# Patient Record
Sex: MALE | Race: WHITE | Employment: OTHER | URBAN - METROPOLITAN AREA
[De-identification: names, ages, dates, MRNs, and addresses within clinical notes are randomized per-mention and may not be internally consistent; named-entity substitution may affect disease eponyms.]

---

## 2022-02-09 NOTE — PROGRESS NOTES
[address] ELECTROPHYSIOLOGY DEPARTMENT/DIVISION OF CARDIOLOGY  Outpatient Consultation Report  PATIENT: Abril Thorpe  MEDICAL RECORD NUMBER: [id number]  DATE OF SERVICE:  2/9/2022  ATTENDING ELECTROPHYSIOLOGIST:  Gume Reynaga D.O  REFERRING PHYSICIAN: No ref. provider found and No primary care provider on file. CHIEF COMPLAINT: cardiomyopathy    HPI: Abril Thorpe is a 78 y.o. male with a history of CAD sp CABG x 5 (5/2009: LIMA-LAD, SVG-distal RCA, SVG-D1, SVG-RI, SVG-OM1), CM-uncertain etiology, CVA-ischemic (2016), and HTN. He is managed by [facility] with lipitor 80 mg daily, HCTZ 25 mg daily, losartan 25 mg daily, metoprolol XL 50 mg daily, and spironolactone 12.5 mg daily. In 2009, he was diagnosed with multivessel CAD and had CABG. In 2016, he was noted to have LVEF of 30%. It is unclear what his LVEF was prior to this. Echo since that time have consistently reported LVEF of 30-35%. An exercise stress without imaging was performed, which reported frequent PVCs and achieved 92% of APMHR. He is now referred to my office for consideration of primary prevention ICD. Patient denies any complaints at this time. Prior cardiac testing:   · TTE (5/20/21 at [facility]) LVEF = 30-35%, mildly dilated ascending aorta, mild AS, stage I LVDD. · Exercise Stress (10/16/19 at [facility]): no ischemic ECG changes at 92% of APMHR, frequent PVCs noted   · TTE (11/2018 at [facility]): LVEF = 30-35% with inferior akinesis, mild LAE, mild - mod MR, mild - mod AI, mild TR, mild LVDD.      · TTE (1/2017): LVEF = 30-35% with inferior wall akinesis, mild RV dilation, mild RVSD, moderate LAE, mild PATRICK, mild MR, mild TR, mild AI, mild PI.  · TTE (2016): LVEF = 30%, mild LV dilation, mild concentric LVH, moderate RVSD, moderate LAE, moderate MR, mild TR, mod pulm HTN, mild-mod A.  · LHC (5/2009): LM 55% stenosis, 90% mid LAD stenosis, 80% proximal RI stenosis, 60% Diag stenosis, 90% proximal LCx stenosis with diffuse distal disease, and 70% RCA stenosis. No past medical history on file. History reviewed. No pertinent surgical history. No family history on file. There is no family history of sudden cardiac arrest    Social History     Tobacco Use    Smoking status: Not on file    Smokeless tobacco: Not on file   Substance Use Topics    Alcohol use: Not on file       No current outpatient medications on file. No current facility-administered medications for this visit. Not on File    ROS:   Constitutional: Negative for fever, activity change and appetite change. HENT: Negative for epistaxis. Eyes: Negative for diploplia, blurred vision. Respiratory: Negative for cough, chest tightness, shortness of breath and wheezing. Cardiovascular: pertinent positives in HPI  Gastrointestinal: Negative for abdominal pain and blood in stool. Genitourinary: Negative for hematuria and difficulty urinating. Musculoskeletal: Negative for myalgias and gait problem. Skin: Negative for color change and rash. Neurological: Negative for syncope and light-headedness. Psychiatric/Behavioral: Negative for confusion and agitation. The patient is not nervous/anxious. Heme: no bleeding disorders, no melena or hematochezia  All other review of systems are negative     PHYSICAL EXAM:  VS: /68   Pulse 70   Resp 18   Ht 5' 8.5\" (1.74 m)   Wt 166 lb (75.3 kg)   SpO2 98%   BMI 24.87 kg/m²   Constitutional: Well-developed, no acute distress, well groomed  Eyes: conjunctivae normal, no xanthelasma   Ears, Nose, Throat: oral mucosa moist, no cyanosis   Neck: supple, no JVD, no bruits, no thyromegaly   CV: normal rate, regular rhythm,  no murmurs, rubs, or gallops.  PMI is nondisplaced, Peripheral pulses normal including carotid auscultation, no noted aortic bruit, bilateral femoral and pedal pulses are normal in quality  Lungs: clear to auscultation bilaterally, normal respiratory effort without used of accessory muscles, no wheezes  Abdomen: soft, non-tender, bowel sounds present, no masses or hepatomegaly   Extremities: no digital clubbing, no edema   Skin: warm, no rashes   Neuro/Psych: A&O x 3, normal mood and affect    Cardiac testing done today:   · EKG (2/10/22): SR at 70 bpm, 1* AV block, and QTc = 397 msec    Assessment/Plan:  1. CM  -Uncertain etiology, but presumably ischemic.  -In 2016, he was noted to have LVEF of 30%. It is unclear what his LVEF was prior to this. Echo since that time have consistently reported LVEF of 30-35%. An exercise stress without imaging was performed, which reported frequent PVCs. -Last LVEF assessment was almost 1 year ago. Recommend reassessment with TTE.  -Follow-up with my office in 6 weeks to review echocardiogram result. If LVEF reduced recommend 72 hour 3 lead Holter to further evaluate VE as well as exercise nuclear stress. -Medical management per [facility]. 2. CAD sp CABG x 5 (5/2009: LIMA-LAD, SVG-distal RCA, SVG-D1, SVG-RI, SVG-OM1)  -Management per [facility]. 3. CVA-ischemic (5634)  -Uncertain mechanism.  -Consider external rhythm monitor in future followed by ILR if no AF/AFL. I spent a total of 60 minutes reviewing previous notes, test results, and face to face with the patient discussing the diagnosis and importance of compliance with the treatment plan as well as documenting on the day of the visit. Time of the day of service includes:  · Preparing to see the patient (eg. Review of the medical record, such as tests). · Obtaining and/or reviewing separately obtained history. · Ordering prescription medications, tests, and/or procedures. · Communicating results to the patient/family/caregiver. · Counseling/educating the patient/family/caregiver. · Documenting clinical information in the patients electronic record. · Coordination of care for the patient. · Performing a medical appropriate exam and/or evaluation.      Thank you for allowing me to participate in your patient's care. Please call me if there are any questions. Manuel Wakefield D.O.   Cardiac Electrophysiology  [facility]

## 2022-02-10 ENCOUNTER — TELEPHONE (OUTPATIENT)
Dept: CARDIOLOGY | Age: 82
End: 2022-02-10

## 2022-02-10 ENCOUNTER — OFFICE VISIT (OUTPATIENT)
Dept: NON INVASIVE DIAGNOSTICS | Age: 82
End: 2022-02-10
Payer: OTHER GOVERNMENT

## 2022-02-10 VITALS
DIASTOLIC BLOOD PRESSURE: 68 MMHG | HEART RATE: 70 BPM | RESPIRATION RATE: 18 BRPM | SYSTOLIC BLOOD PRESSURE: 122 MMHG | HEIGHT: 69 IN | WEIGHT: 166 LBS | BODY MASS INDEX: 24.59 KG/M2 | OXYGEN SATURATION: 98 %

## 2022-02-10 DIAGNOSIS — I42.9 CARDIOMYOPATHY, UNSPECIFIED TYPE (HCC): Primary | ICD-10-CM

## 2022-02-10 PROCEDURE — 99205 OFFICE O/P NEW HI 60 MIN: CPT | Performed by: STUDENT IN AN ORGANIZED HEALTH CARE EDUCATION/TRAINING PROGRAM

## 2022-02-10 PROCEDURE — 93000 ELECTROCARDIOGRAM COMPLETE: CPT | Performed by: STUDENT IN AN ORGANIZED HEALTH CARE EDUCATION/TRAINING PROGRAM

## 2022-02-10 RX ORDER — LOSARTAN POTASSIUM 25 MG/1
TABLET ORAL
COMMUNITY
Start: 2021-04-27

## 2022-02-10 RX ORDER — SPIRONOLACTONE 25 MG/1
TABLET ORAL
COMMUNITY
Start: 2021-11-02

## 2022-02-10 RX ORDER — ATORVASTATIN CALCIUM 80 MG/1
TABLET, FILM COATED ORAL
COMMUNITY
Start: 2021-10-19

## 2022-02-10 RX ORDER — SILDENAFIL 100 MG/1
TABLET, FILM COATED ORAL
COMMUNITY
Start: 2021-12-01

## 2022-02-10 RX ORDER — HYDROCHLOROTHIAZIDE 25 MG/1
TABLET ORAL
COMMUNITY
Start: 2021-04-12

## 2022-02-10 RX ORDER — METOPROLOL SUCCINATE 50 MG/1
TABLET, EXTENDED RELEASE ORAL
COMMUNITY
Start: 2021-10-14

## 2022-02-10 NOTE — PATIENT INSTRUCTIONS
No medication changes at this time. You will be contacted to schedule echocardiogram. If echocardiogram reported reduced cardiac function, you may need titration of medication as well as potentially additional testing prior to decision regarding need for pacemaker or defibrillator will be made.   Follow-up with Dr Kyle Gasca office in 6 weeks to review result of echocardiogram.

## 2022-02-10 NOTE — TELEPHONE ENCOUNTER
SCHEDULED ECHO FOR 03-28-22. REVIEWED COVID CHECKLIST WITH PATIENT.     Electronically signed by Yosvany Timmons on 2/10/2022 at 1:38 PM

## 2022-04-13 ENCOUNTER — TELEPHONE (OUTPATIENT)
Dept: CARDIOLOGY | Age: 82
End: 2022-04-13

## 2022-04-18 ENCOUNTER — HOSPITAL ENCOUNTER (OUTPATIENT)
Dept: CARDIOLOGY | Age: 82
Discharge: HOME | End: 2022-04-18
Payer: MEDICARE

## 2022-04-18 DIAGNOSIS — I42.9 CARDIOMYOPATHY, UNSPECIFIED TYPE (HCC): ICD-10-CM

## 2022-04-18 LAB
LV EF: 40 %
LVEF MODALITY: NORMAL

## 2022-04-18 PROCEDURE — 93306 TTE W/DOPPLER COMPLETE: CPT

## 2022-04-29 ENCOUNTER — TELEPHONE (OUTPATIENT)
Dept: NON INVASIVE DIAGNOSTICS | Age: 82
End: 2022-04-29

## 2022-04-29 DIAGNOSIS — I49.3 PVC (PREMATURE VENTRICULAR CONTRACTION): Primary | ICD-10-CM

## 2022-05-02 DIAGNOSIS — R94.31 ABNORMAL EKG: Primary | ICD-10-CM

## 2022-05-02 NOTE — TELEPHONE ENCOUNTER
LVEF = 40%. Recommend:  -72 hour 3 lead Holter  -Exercise nuclear stress.  Hold metoprolol for 24 hours prior to test.    Please inform patient.  -Stephie Galvez, DO

## 2022-05-11 ENCOUNTER — TELEPHONE (OUTPATIENT)
Dept: CARDIOLOGY | Age: 82
End: 2022-05-11

## 2022-05-11 NOTE — TELEPHONE ENCOUNTER
Spoke with patient reminder of his appointment on 5/13/22 at 0700 for a Nuclear stress test instructed on NPO after midnight, no caffeine 12 hours prior to the test ,hold Toprol XL 24 hours prior to the test patient verbalized an understanding and confirmed the appointment.

## 2022-05-13 ENCOUNTER — HOSPITAL ENCOUNTER (OUTPATIENT)
Dept: CARDIOLOGY | Age: 82
Discharge: HOME | End: 2022-05-13
Payer: MEDICARE

## 2022-05-13 ENCOUNTER — NURSE ONLY (OUTPATIENT)
Dept: NON INVASIVE DIAGNOSTICS | Age: 82
End: 2022-05-13

## 2022-05-13 VITALS
HEIGHT: 67 IN | DIASTOLIC BLOOD PRESSURE: 64 MMHG | BODY MASS INDEX: 26.06 KG/M2 | WEIGHT: 166 LBS | HEART RATE: 72 BPM | SYSTOLIC BLOOD PRESSURE: 108 MMHG | RESPIRATION RATE: 16 BRPM

## 2022-05-13 DIAGNOSIS — R94.31 ABNORMAL EKG: Primary | ICD-10-CM

## 2022-05-13 DIAGNOSIS — R94.31 ABNORMAL EKG: ICD-10-CM

## 2022-05-13 LAB
LV EF: 44 %
LVEF MODALITY: NORMAL

## 2022-05-13 PROCEDURE — 78452 HT MUSCLE IMAGE SPECT MULT: CPT

## 2022-05-13 PROCEDURE — 2580000003 HC RX 258: Performed by: INTERNAL MEDICINE

## 2022-05-13 PROCEDURE — A9500 TC99M SESTAMIBI: HCPCS | Performed by: INTERNAL MEDICINE

## 2022-05-13 PROCEDURE — 3430000000 HC RX DIAGNOSTIC RADIOPHARMACEUTICAL: Performed by: INTERNAL MEDICINE

## 2022-05-13 PROCEDURE — 93017 CV STRESS TEST TRACING ONLY: CPT

## 2022-05-13 RX ORDER — ASPIRIN 81 MG/1
81 TABLET ORAL
COMMUNITY
Start: 2016-03-15

## 2022-05-13 RX ORDER — SODIUM CHLORIDE 0.9 % (FLUSH) 0.9 %
10 SYRINGE (ML) INJECTION PRN
Status: DISCONTINUED | OUTPATIENT
Start: 2022-05-13 | End: 2022-05-14 | Stop reason: HOSPADM

## 2022-05-13 RX ADMIN — SODIUM CHLORIDE, PRESERVATIVE FREE 10 ML: 5 INJECTION INTRAVENOUS at 07:07

## 2022-05-13 RX ADMIN — Medication 33.1 MILLICURIE: at 08:19

## 2022-05-13 RX ADMIN — Medication 10.5 MILLICURIE: at 07:06

## 2022-05-13 RX ADMIN — SODIUM CHLORIDE, PRESERVATIVE FREE 10 ML: 5 INJECTION INTRAVENOUS at 08:19

## 2022-05-13 NOTE — PROGRESS NOTES
Patient was seen in Office today to have 72 hour 3-lead monitor put on per Dr. Christophe Nissen. Pt tolerated placement and understood use and return of device number [id number].     Electronically signed by Dionne Pate MA on 5/13/2022 at 9:16 AM

## 2022-05-13 NOTE — PROCEDURES
[address],[facility] [address]  [phone]                Exercise Stress Nuclear Gated SPECT Study    Name: Lenora Renae Account Number: [de-identified]    :  1940      Sex: male              Date of Study:  2022    Height: 5' 7\" (170.2 cm)  Weight: 166 lb (75.3 kg)     Ordering Provider: Karma France DO          PCP: No primary care provider on file. Cardiologist:Cirilo Martin DO                      Interpreting Physician: Jacob Parisi DO  _________________________________________________________________________________    Indication:   Evaluation of extent and severity of coronary artery disease    Clinical History:   Patient has prior history of coronary artery disease. Resting ECG:    Sinus rhythm, 75 bpm.  Normal axis. Diffuse T wave inversions. Exercise: The patient exercised using a Jose protocol, completing 5:00 minutes and reaching an estimated work load of 7.0 metabolic equivalents (METS). Resting HR was 75. Peak exercise heart rate was 138 ( 101% of maximum predicted heart rate for age). Baseline /64. Peak exercise /70. The blood pressure response to exercise was normal      Exercise was terminated due to dyspnea. The patient experienced no chest pain with exercise. Exercise ECG:   The patient demonstrated occasional PAC's and occasional PVC's during exercise PVC's,PVC couplets in recovery. With exercise, there were no ST segment changes of significance at the heart rate achieved. Bean treadmill score was 5 implying low risk. IMAGING: Myocardial perfusion imaging was performed at rest 30-35 minutes following the intravenous injection of 10.5 mCi of (Tc-Sestamibi) followed by 10 ml of Normal Saline. At peak exercise, the patient was injected intravenously with 33.1 mCi of (Tc-Sestamibi) followed by 10 ml of Normal Saline. Gated post-stress tomographic imaging was performed 20-25 minutes after stress. FINDINGS: The overall quality of the study was good. Left ventricular cavity size was noted to be normal.    Rotational analog analysis demonstrated soft tissue diaphragmatic attenuation. The gated SPECT stress imaging in the short, vertical long, and horizontal long axis demonstrated     A mild defect was present in the basal inferolateral, basal inferior, mid inferolateral and mid inferior wall(s) that was  moderate sized by quantification. The resting images show no change. Gated SPECT left ventricular ejection fraction was calculated to be 44%, with normal myocardial thickening and wall motion and hypokinesis of the inferior  wall. Impression:    1. Exercise EKG was  negative. 2. The patient experienced no chest pain with exercise. 3. The myocardial perfusion imaging was abnormal.      The abnormality was a a moderate sized fixed defect in the Inferior and inferolateral wall suggestive of a prior MI\    4. Overall left ventricular systolic function was abnormal with regional wall motion abnormalities. 5. Bean treadmill score was 5 implying low risk. 6. Exercise capacity was above average. 7. Low risk general exercise treadmill test.    Thank you for sending your patient to this [employer].      Electronically signed by Alfonso Sales DO on 5/13/22 at 1:09 PM EDT

## 2022-05-16 ENCOUNTER — TELEPHONE (OUTPATIENT)
Dept: NON INVASIVE DIAGNOSTICS | Age: 82
End: 2022-05-16

## 2022-05-16 NOTE — TELEPHONE ENCOUNTER
----- Message from Loree Leahy DO sent at 5/14/2022 10:56 PM EDT -----  Regarding: stress  Please let patient know stress reported LVEF of 44% with evidence of prior infarct, but no ischemia.      Await results of cardiac monitor.    -Loree Leahy DO

## 2022-06-02 DIAGNOSIS — I49.3 PVC (PREMATURE VENTRICULAR CONTRACTION): ICD-10-CM

## 2022-06-27 NOTE — PROGRESS NOTES
[facility] CARDIOLOGY  CARDIAC ELECTROPHYSIOLOGY DEPARTMENT/DIVISION OF CARDIOLOGY  Outpatient Progress Report  PATIENT: Martha Vuong RECORD NUMBER: [id number]  DATE OF SERVICE:  7/1/2022  ATTENDING ELECTROPHYSIOLOGIST:  Jake Bean D.O  REFERRING PHYSICIAN: No ref. provider found and No primary care provider on file. CHIEF COMPLAINT: cardiomyopathy    HPI: Hua Dee is a 78 y.o. male with a history of NYHA class I HFpEF- borderline/ischemic sp CABG x 5 (5/2009: LIMA-LAD, SVG-distal RCA, SVG-D1, SVG-RI, SVG-OM1), CVA-ischemic (2016), and HTN. He is managed by [facility] with aspirin 81 mg daily, lipitor 80 mg daily, HCTZ 25 mg daily, losartan 25 mg daily, metoprolol XL 50 mg daily, and spironolactone 12.5 mg daily. In 2009, he was diagnosed with multivessel CAD, which was treated with CABG 5. In 2016, he was noted to have LVEF of 30%. It is unclear what his LVEF was prior to this. Echo since that time have consistently reported LVEF of 30-35%. An exercise stress without imaging was performed, which reported frequent PVCs and achieved 92% of APMHR. In 2/2022, patient was referred to my office for evaluation. I recommended a TTE, event monitor, and stress test.  Results of testing significant for LVEF of 40-44% with inferior and inferior lateral infarct but no ischemia and no significant dysrhythmias. He now present today, 7/1/22; for follow-up with my office. He denies any complaints at this time. Prior cardiac testing:   · 72 hour 3 lead Holter (5/16/22): SR at 49 - 128 bpm (mean: 72 bpm), SVE burden < 1%, 2 episodes of nonsustained SVT (longest/fastest: 3 beats at 111 bpm), VE burden = 3.42% (3 morphologies: 70% 1 morphology, 30% 2nd morphology, <1% 3rd morphology), 1 episode of asymptoamtic non-sustained VT (3 beats at ~100 bpm),1 patient event during SR with single PVC, and no AF, VT, significant AV block, or significat pauses.   · Exercise Stress Nuclear: 5/13/2022: Achieved 101% of APM HR (138 bpm), 7 METS, above average exercise capacity, LVEF = 44% with hypokinetic inferior wall, inferior and inferior lateral wall infarct, and no ischemia. · TTE: 2022: LVEF: 40%, mild concentric LVH (max = 1.3 cm), mild LAE, mild MR, mild AI. · EKG (2/10/22): SR at 70 bpm, 1* AV block, and QTc = 397 msec  · TTE (21 at [facility]) LVEF = 30-35%, mildly dilated ascending aorta, mild AS, stage I LVDD. · Exercise Stress (10/16/19 at [facility]): no ischemic ECG changes at 92% of APMHR, frequent PVCs noted   · TTE (2018 at [facility]): LVEF = 30-35% with inferior akinesis, mild LAE, mild - mod MR, mild - mod AI, mild TR, mild LVDD. · TTE (2017): LVEF = 30-35% with inferior wall akinesis, mild RV dilation, mild RVSD, moderate LAE, mild PATRICK, mild MR, mild TR, mild AI, mild PI.  · TTE (): LVEF = 30%, mild LV dilation, mild concentric LVH, moderate RVSD, moderate LAE, moderate MR, mild TR, mod pulm HTN, mild-mod A.  · [facility] (2009): LM 55% stenosis, 90% mid LAD stenosis, 80% proximal RI stenosis, 60% Diag stenosis, 90% proximal LCx stenosis with diffuse distal disease, and 70% RCA stenosis. History reviewed. No pertinent past medical history. History reviewed. No pertinent surgical history. Family History   Problem Relation Age of Onset    Heart Surgery Brother      There is no family history of sudden cardiac arrest    Social History     Tobacco Use    Smoking status: Former Smoker     Types: Cigars     Quit date:      Years since quittin.5    Smokeless tobacco: Never Used   Substance Use Topics    Alcohol use:  Yes     Alcohol/week: 4.0 standard drinks     Types: 4 Glasses of wine per week     Comment: 8 oz of wine daily       Current Outpatient Medications   Medication Sig Dispense Refill    aspirin 81 MG EC tablet Take 81 mg by mouth      CO ENZYME Q-10 PO Take 1 capsule by mouth daily      losartan (COZAAR) 25 MG tablet TAKE ONE TABLET BY MOUTH EVERY DAY      Miconazole Nitrate 2 % SOLN APPLY A SUFFICIENT AMOUNT EXTERNALLY EVERY DAY TO TOENAILS      spironolactone (ALDACTONE) 25 MG tablet TAKE ONE-HALF TABLET BY MOUTH EVERY DAY **FOR HEART**      metoprolol succinate (TOPROL XL) 50 MG extended release tablet TAKE ONE TABLET BY MOUTH EVERY DAY      hydroCHLOROthiazide (HYDRODIURIL) 25 MG tablet TAKE ONE TABLET BY MOUTH EVERY MORNING      atorvastatin (LIPITOR) 80 MG tablet TAKE ONE TABLET BY MOUTH AT BEDTIME      sildenafil (VIAGRA) 100 MG tablet TAKE ONE TABLET BY MOUTH AN HOUR_BEFORE SEX. (NO MORE THAN 1 DOSE PER 24 HOURS) NO NITRATES       No current facility-administered medications for this visit. Allergies   Allergen Reactions    Tadalafil Other (See Comments)     ? Ringing in the ears      Atenolol Rash       ROS:   Constitutional: Negative for fever, activity change and appetite change. HENT: Negative for epistaxis. Eyes: Negative for diploplia, blurred vision. Respiratory: Negative for cough, chest tightness, shortness of breath and wheezing. Cardiovascular: pertinent positives in HPI  Gastrointestinal: Negative for abdominal pain and blood in stool. Genitourinary: Negative for hematuria and difficulty urinating. Musculoskeletal: Negative for myalgias and gait problem. Skin: Negative for color change and rash. Neurological: Negative for syncope and light-headedness. Psychiatric/Behavioral: Negative for confusion and agitation. The patient is not nervous/anxious.   Heme: no bleeding disorders, no melena or hematochezia  All other review of systems are negative     PHYSICAL EXAM:  VS: /70 (Site: Left Upper Arm, Position: Sitting, Cuff Size: Medium Adult)   Pulse 60   Resp 18   Ht 5' 7\" (1.702 m)   Wt 161 lb 3.2 oz (73.1 kg)   BMI 25.25 kg/m²   Constitutional: Well-developed, no acute distress, well groomed  Eyes: conjunctivae normal, no xanthelasma   Ears, Nose, Throat: oral mucosa moist, no cyanosis   Neck: supple, no JVD, no thyromegaly   CV: normal rate, regular rhythm,  no murmurs, rubs, or gallops. PMI is nondisplaced, Peripheral pulses normal including bilateral radial and pedal pulses are normal in quality  Lungs: clear to auscultation bilaterally, normal respiratory effort without used of accessory muscles, no wheezes  Abdomen: soft, non-tender, bowel sounds present, no masses or hepatomegaly   Extremities: no digital clubbing, no edema   Skin: warm, no rashes   Neuro/Psych: A&O x 3, normal mood and affect    Cardiac testing done today:   · EKG (7/1/22): SR at 60 bpm, PACs, QTC = 420 ms    Assessment/Plan:  1. NYHA class I HFpEF- borderline/Ischemic sp CABG x 5 (5/2009: LIMA-LAD, SVG-distal RCA, SVG-D1, SVG-RI, SVG-OM1)  -Medical management per [facility]. 2. CVA-ischemic (5621)  -Uncertain mechanism after echo and external monitor.  -Recommend [employer] ILR for ongoing monitoring of occult AF. I reviewed indications, material risks, and benefits. He is agreeable to proceed. -Follow-up with [facility] 2 weeks after implant.  -Follow-up with Dr Mckeon office in 1 year. I spent a total of 40 minutes reviewing previous notes, test results, and face to face with the patient discussing the diagnosis and importance of compliance with the treatment plan as well as documenting on the day of the visit. Time of the day of service includes:  · Preparing to see the patient (eg. Review of the medical record, such as tests). · Obtaining and/or reviewing separately obtained history. · Ordering prescription medications, tests, and/or procedures. · Communicating results to the patient/family/caregiver. · Counseling/educating the patient/family/caregiver. · Documenting clinical information in the patients electronic record. · Coordination of care for the patient. · Performing a medical appropriate exam and/or evaluation. Thank you for allowing me to participate in your patient's care.   Please call me if there are any questions. Sridevi Henriquez D.O. Cardiac Electrophysiology  [facility]  [facility]    Cc: No primary care provider on file.

## 2022-07-01 ENCOUNTER — OFFICE VISIT (OUTPATIENT)
Dept: NON INVASIVE DIAGNOSTICS | Age: 82
End: 2022-07-01
Payer: MEDICARE

## 2022-07-01 VITALS
WEIGHT: 161.2 LBS | DIASTOLIC BLOOD PRESSURE: 70 MMHG | HEIGHT: 67 IN | RESPIRATION RATE: 18 BRPM | BODY MASS INDEX: 25.3 KG/M2 | HEART RATE: 60 BPM | SYSTOLIC BLOOD PRESSURE: 138 MMHG

## 2022-07-01 DIAGNOSIS — I42.9 CARDIOMYOPATHY, UNSPECIFIED TYPE (HCC): ICD-10-CM

## 2022-07-01 DIAGNOSIS — I49.3 PVC (PREMATURE VENTRICULAR CONTRACTION): Primary | ICD-10-CM

## 2022-07-01 PROBLEM — Z95.1 POSTSURGICAL AORTOCORONARY BYPASS STATUS: Status: ACTIVE | Noted: 2022-07-01

## 2022-07-01 PROBLEM — I25.10 ISCHEMIC DILATED CARDIOMYOPATHY DUE TO CORONARY ARTERY DISEASE: Status: ACTIVE | Noted: 2022-07-01

## 2022-07-01 PROBLEM — J90 PLEURAL EFFUSION: Status: ACTIVE | Noted: 2022-07-01

## 2022-07-01 PROBLEM — S44.90XA: Status: ACTIVE | Noted: 2022-07-01

## 2022-07-01 PROBLEM — H93.19 SUBJECTIVE TINNITUS: Status: ACTIVE | Noted: 2022-07-01

## 2022-07-01 PROBLEM — M72.2 PLANTAR FASCIITIS: Status: ACTIVE | Noted: 2022-07-01

## 2022-07-01 PROBLEM — N40.0 HYPERTROPHY OF PROSTATE WITHOUT URINARY OBSTRUCTION AND OTHER LOWER URINARY TRACT SYMPTOMS (LUTS): Status: ACTIVE | Noted: 2022-07-01

## 2022-07-01 PROBLEM — M25.559 PAIN IN JOINT INVOLVING PELVIC REGION AND THIGH: Status: ACTIVE | Noted: 2022-07-01

## 2022-07-01 PROBLEM — H91.90 HEARING LOSS: Status: ACTIVE | Noted: 2022-07-01

## 2022-07-01 PROBLEM — I25.5 ISCHEMIC DILATED CARDIOMYOPATHY DUE TO CORONARY ARTERY DISEASE: Status: ACTIVE | Noted: 2022-07-01

## 2022-07-01 PROBLEM — F52.8 PSYCHOSEXUAL DYSFUNCTION WITH INHIBITED SEXUAL EXCITEMENT: Status: ACTIVE | Noted: 2022-07-01

## 2022-07-01 PROBLEM — N42.83 PROSTATIC CYST: Status: ACTIVE | Noted: 2022-07-01

## 2022-07-01 PROBLEM — M25.519 SHOULDER PAIN: Status: ACTIVE | Noted: 2022-07-01

## 2022-07-01 PROBLEM — I25.10 ATHEROSCLEROTIC HEART DISEASE OF NATIVE CORONARY ARTERY WITHOUT ANGINA PECTORIS: Status: ACTIVE | Noted: 2022-07-01

## 2022-07-01 PROBLEM — B35.1 TINEA UNGUIUM: Status: ACTIVE | Noted: 2022-07-01

## 2022-07-01 PROBLEM — R41.3 MILD MEMORY DISTURBANCE: Status: ACTIVE | Noted: 2022-07-01

## 2022-07-01 PROBLEM — R69 OTHER ILL-DEFINED AND UNKNOWN CAUSES OF MORBIDITY AND MORTALITY: Status: ACTIVE | Noted: 2022-07-01

## 2022-07-01 PROBLEM — E78.00 HYPERCHOLESTEROLEMIA: Status: ACTIVE | Noted: 2022-07-01

## 2022-07-01 PROBLEM — M51.26 HERNIATION OF LUMBAR INTERVERTEBRAL DISC WITHOUT MYELOPATHY: Status: ACTIVE | Noted: 2022-07-01

## 2022-07-01 PROBLEM — I20.0 UNSTABLE ANGINA PECTORIS (HCC): Status: ACTIVE | Noted: 2022-07-01

## 2022-07-01 PROBLEM — E78.5 DYSLIPIDEMIA: Status: ACTIVE | Noted: 2022-07-01

## 2022-07-01 PROBLEM — H72.00 CENTRAL PERFORATION OF TYMPANIC MEMBRANE: Status: ACTIVE | Noted: 2022-07-01

## 2022-07-01 PROBLEM — I50.22 CHRONIC SYSTOLIC (CONGESTIVE) HEART FAILURE (HCC): Status: ACTIVE | Noted: 2022-07-01

## 2022-07-01 PROBLEM — H53.453 OTHER LOCALIZED VISUAL FIELD DEFECT, BILATERAL: Status: ACTIVE | Noted: 2022-07-01

## 2022-07-01 PROBLEM — M77.9 ENTHESOPATHY: Status: ACTIVE | Noted: 2022-07-01

## 2022-07-01 PROBLEM — H53.461 RIGHT HOMONYMOUS HEMIANOPSIA: Status: ACTIVE | Noted: 2022-07-01

## 2022-07-01 PROBLEM — M54.9 BACK PAIN: Status: ACTIVE | Noted: 2022-07-01

## 2022-07-01 PROBLEM — I25.5 ISCHEMIC CARDIOMYOPATHY: Status: ACTIVE | Noted: 2022-07-01

## 2022-07-01 PROBLEM — M77.10 LATERAL EPICONDYLITIS: Status: ACTIVE | Noted: 2022-07-01

## 2022-07-01 PROBLEM — H90.3 SENSORINEURAL HEARING LOSS, BILATERAL: Status: ACTIVE | Noted: 2022-07-01

## 2022-07-01 PROBLEM — I10 BENIGN ESSENTIAL HYPERTENSION: Status: ACTIVE | Noted: 2022-07-01

## 2022-07-01 PROBLEM — I73.9 PERIPHERAL VASCULAR DISEASE, UNSPECIFIED (HCC): Status: ACTIVE | Noted: 2022-07-01

## 2022-07-01 PROCEDURE — 93000 ELECTROCARDIOGRAM COMPLETE: CPT | Performed by: STUDENT IN AN ORGANIZED HEALTH CARE EDUCATION/TRAINING PROGRAM

## 2022-07-01 PROCEDURE — 99215 OFFICE O/P EST HI 40 MIN: CPT | Performed by: STUDENT IN AN ORGANIZED HEALTH CARE EDUCATION/TRAINING PROGRAM

## 2022-07-01 PROCEDURE — 1123F ACP DISCUSS/DSCN MKR DOCD: CPT | Performed by: STUDENT IN AN ORGANIZED HEALTH CARE EDUCATION/TRAINING PROGRAM

## 2022-07-01 NOTE — PATIENT INSTRUCTIONS
No medication changes at this time. You will be contacted to schedule [facility] [employer] cardiac rhythm monitor implant due to history of cryptogenic stroke. Follow-up with Device [facility] 2 weeks after implant. Follow-up with Dr Sanam Hanna office in 1 year.

## 2022-07-13 ENCOUNTER — TELEPHONE (OUTPATIENT)
Dept: CARDIAC CATH/INVASIVE PROCEDURES | Age: 82
End: 2022-07-13

## 2022-07-13 NOTE — TELEPHONE ENCOUNTER
Reminded patient of scheduled procedure on  7/14   Instructions given and COVID questionnaire completed.

## 2022-07-14 ENCOUNTER — HOSPITAL ENCOUNTER (OUTPATIENT)
Dept: CARDIAC CATH/INVASIVE PROCEDURES | Age: 82
Discharge: HOME | End: 2022-07-14
Payer: MEDICARE

## 2022-07-14 VITALS
TEMPERATURE: 97.6 F | RESPIRATION RATE: 20 BRPM | HEART RATE: 67 BPM | WEIGHT: 160 LBS | BODY MASS INDEX: 25.11 KG/M2 | HEIGHT: 67 IN | SYSTOLIC BLOOD PRESSURE: 151 MMHG | OXYGEN SATURATION: 97 % | DIASTOLIC BLOOD PRESSURE: 77 MMHG

## 2022-07-14 PROCEDURE — 33285 INSJ SUBQ CAR RHYTHM MNTR: CPT | Performed by: STUDENT IN AN ORGANIZED HEALTH CARE EDUCATION/TRAINING PROGRAM

## 2022-07-14 PROCEDURE — C1764 EVENT RECORDER, CARDIAC: HCPCS

## 2022-07-14 PROCEDURE — 33285 INSJ SUBQ CAR RHYTHM MNTR: CPT

## 2022-07-14 NOTE — OP NOTE
Operative Note      Patient: Tito Kolb  YOB: 1940  MRN: [id number]    Date of Procedure:  7/14/2022      Patient History: Tito Kolb is a 78 y.o. male with a history of NYHA class I HFpEF- borderline/ischemic sp CABG x 5 (5/2009: LIMA-LAD, SVG-distal RCA, SVG-D1, SVG-RI, SVG-OM1), CVA-ischemic (2016), and HTN. He is managed by [facility] with aspirin 81 mg daily, lipitor 80 mg daily, HCTZ 25 mg daily, losartan 25 mg daily, metoprolol XL 50 mg daily, and spironolactone 12.5 mg daily. In 2009, he was diagnosed with multivessel CAD, which was treated with CABG 5. In 2016, he was noted to have LVEF of 30%. It is unclear what his LVEF was prior to this. Echo since that time have consistently reported LVEF of 30-35%. An exercise stress without imaging was performed, which reported frequent PVCs and achieved 92% of APMHR. In 2/2022, patient was referred to my office for evaluation. I recommended a TTE, event monitor, and stress test.  Results of testing significant for LVEF of 40-44% with inferior and inferior lateral infarct but no ischemia and no significant dysrhythmias. He presents today, 7/14/2022; for outpatient [facility] cardiac monitor implant for further monitoring for occult AF in the setting of cryptogenic stroke-ischemic of uncertain etiology after evaluation, including TTE and external rhythm monitor. He denies any complaints at this time. Pre-Op Diagnosis: cryptogenic stroke     Post-Op Diagnosis: Same     Surgery: [employer] LUX cardiac monitor implant (CPT code: 67538)     Surgeon: Dilip Perez DO     Assistant: None     Anesthesia: local     Estimated Blood Loss (mL): 5 mL     Complications: none     Detailed Description of Procedure:   Verbal and written consent was obtained from the patient and/or family. Patient was brought to the procedure room in a fasting state. The chest was cleansed with chlorhexidine gluconate and draped in a sterile fashion.

## 2022-07-14 NOTE — H&P
[address] ELECTROPHYSIOLOGY DEPARTMENT/DIVISION OF CARDIOLOGY  H&P  PATIENT: Martha Vuong RECORD NUMBER: [id number]  DATE OF SERVICE:  7/14/2022  ATTENDING ELECTROPHYSIOLOGIST:  Miky Tao D.O  REFERRING PHYSICIAN: Jessica Davis DO and No primary care provider on file. CHIEF COMPLAINT: cardiomyopathy    HPI: Jackson Lawrence is a 78 y.o. male with a history of NYHA class I HFpEF- borderline/ischemic sp CABG x 5 (5/2009: LIMA-LAD, SVG-distal RCA, SVG-D1, SVG-RI, SVG-OM1), CVA-ischemic (2016), and HTN. He is managed by [facility] with aspirin 81 mg daily, lipitor 80 mg daily, HCTZ 25 mg daily, losartan 25 mg daily, metoprolol XL 50 mg daily, and spironolactone 12.5 mg daily. In 2009, he was diagnosed with multivessel CAD, which was treated with CABG 5. In 2016, he was noted to have LVEF of 30%. It is unclear what his LVEF was prior to this. Echo since that time have consistently reported LVEF of 30-35%. An exercise stress without imaging was performed, which reported frequent PVCs and achieved 92% of APMHR. In 2/2022, patient was referred to my office for evaluation. I recommended a TTE, event monitor, and stress test.  Results of testing significant for LVEF of 40-44% with inferior and inferior lateral infarct but no ischemia and no significant dysrhythmias. He presents today, 7/14/2022; for outpatient [facility] cardiac monitor implant for further monitoring for occult AF in the setting of cryptogenic stroke-ischemic of uncertain etiology after evaluation, including TTE and external rhythm monitor. He denies any complaints at this time.     Prior cardiac testing:   · EKG (7/1/22): SR at 60 bpm, PACs, QTC = 420 ms  · 72 hour 3 lead Holter (5/16/22): SR at 49 - 128 bpm (mean: 72 bpm), SVE burden < 1%, 2 episodes of nonsustained SVT (longest/fastest: 3 beats at 111 bpm), VE burden = 3.42% (3 morphologies: 70% 1 morphology, 30% 2nd morphology, <1% 3rd morphology), 1 episode of asymptoamtic non-sustained VT (3 beats at ~100 bpm),1 patient event during SR with single PVC, and no AF, VT, significant AV block, or significat pauses. · Exercise Stress Nuclear: 2022: Achieved 101% of APM HR (138 bpm), 7 METS, above average exercise capacity, LVEF = 44% with hypokinetic inferior wall, inferior and inferior lateral wall infarct, and no ischemia. · TTE: 2022: LVEF: 40%, mild concentric LVH (max = 1.3 cm), mild LAE, mild MR, mild AI. · EKG (2/10/22): SR at 70 bpm, 1* AV block, and QTc = 397 msec  · TTE (21 at [facility]) LVEF = 30-35%, mildly dilated ascending aorta, mild AS, stage I LVDD. · Exercise Stress (10/16/19 at [facility]): no ischemic ECG changes at 92% of APMHR, frequent PVCs noted   · TTE (2018 at [facility]): LVEF = 30-35% with inferior akinesis, mild LAE, mild - mod MR, mild - mod AI, mild TR, mild LVDD. · TTE (2017): LVEF = 30-35% with inferior wall akinesis, mild RV dilation, mild RVSD, moderate LAE, mild PATRICK, mild MR, mild TR, mild AI, mild PI.  · TTE (): LVEF = 30%, mild LV dilation, mild concentric LVH, moderate RVSD, moderate LAE, moderate MR, mild TR, mod pulm HTN, mild-mod A.  · LHC (2009): LM 55% stenosis, 90% mid LAD stenosis, 80% proximal RI stenosis, 60% Diag stenosis, 90% proximal LCx stenosis with diffuse distal disease, and 70% RCA stenosis.     Past Medical History:   Diagnosis Date    CAD (coronary artery disease)     Bypass  5 vessel [facility]     Hyperlipidemia     Hypertension      Past Surgical History:   Procedure Laterality Date    SHOULDER SURGERY Right     \"1088'H \"  Football injury       Family History   Problem Relation Age of Onset    Heart Surgery Brother      There is no family history of sudden cardiac arrest    Social History     Tobacco Use    Smoking status: Former Smoker     Types: Cigars     Quit date:      Years since quittin.5    Smokeless tobacco: Never Used   Substance Use Topics    Alcohol use: Yes     Alcohol/week: 4.0 standard drinks     Types: 4 Glasses of wine per week     Comment: 8 oz of wine daily       Current Outpatient Medications   Medication Sig Dispense Refill    aspirin 81 MG EC tablet Take 81 mg by mouth      CO ENZYME Q-10 PO Take 1 capsule by mouth daily      losartan (COZAAR) 25 MG tablet TAKE ONE TABLET BY MOUTH EVERY DAY      Miconazole Nitrate 2 % SOLN APPLY A SUFFICIENT AMOUNT EXTERNALLY EVERY DAY TO TOENAILS      spironolactone (ALDACTONE) 25 MG tablet TAKE ONE-HALF TABLET BY MOUTH EVERY DAY **FOR HEART**      metoprolol succinate (TOPROL XL) 50 MG extended release tablet TAKE ONE TABLET BY MOUTH EVERY DAY      hydroCHLOROthiazide (HYDRODIURIL) 25 MG tablet TAKE ONE TABLET BY MOUTH EVERY MORNING      atorvastatin (LIPITOR) 80 MG tablet TAKE ONE TABLET BY MOUTH AT BEDTIME      sildenafil (VIAGRA) 100 MG tablet TAKE ONE TABLET BY MOUTH AN HOUR_BEFORE SEX. (NO MORE THAN 1 DOSE PER 24 HOURS) NO NITRATES       No current facility-administered medications for this encounter. Allergies   Allergen Reactions    Tadalafil Other (See Comments)     ? Ringing in the ears      Atenolol Rash       ROS:   Constitutional: Negative for fever, activity change and appetite change. HENT: Negative for epistaxis. Eyes: Negative for diploplia, blurred vision. Respiratory: Negative for cough, chest tightness, shortness of breath and wheezing. Cardiovascular: pertinent positives in HPI  Gastrointestinal: Negative for abdominal pain and blood in stool. Genitourinary: Negative for hematuria and difficulty urinating. Musculoskeletal: Negative for myalgias and gait problem. Skin: Negative for color change and rash. Neurological: Negative for syncope and light-headedness. Psychiatric/Behavioral: Negative for confusion and agitation. The patient is not nervous/anxious.   Heme: no bleeding disorders, no melena or hematochezia  All other review of systems are negative     PHYSICAL EXAM:  VS: BP (!) 151/77   Pulse 67   Temp 97.6 °F (36.4 °C) (Temporal)   Resp 20   Ht 5' 7\" (1.702 m)   Wt 160 lb (72.6 kg)   SpO2 97%   BMI 25.06 kg/m²   Constitutional: Well-developed, no acute distress, well groomed  Eyes: conjunctivae normal, no xanthelasma   Ears, Nose, Throat: oral mucosa moist, no cyanosis   Neck: supple, no JVD, no thyromegaly   CV: normal rate, regular rhythm,  no murmurs, rubs, or gallops. PMI is nondisplaced, Peripheral pulses normal including bilateral radial and pedal pulses are normal in quality  Lungs: clear to auscultation bilaterally, normal respiratory effort without used of accessory muscles, no wheezes  Abdomen: soft, non-tender, bowel sounds present, no masses or hepatomegaly   Extremities: no digital clubbing, no edema   Skin: warm, no rashes   Neuro/Psych: A&O x 3, normal mood and affect    Assessment/Plan:  1. Cryptogenic Stroke-ischemic (0481)  -Uncertain mechanism after echo and external monitor.  -Recommend [employer] ILR for ongoing monitoring of occult AF. I reviewed indications, material risks, and benefits. He is agreeable to proceed. -Follow-up with Device [facility] 2 weeks after implant.  -Follow-up with Dr Rhea Allan office in 1 year. 2. NYHA class I HFpEF- borderline/Ischemic sp CABG x 5 (5/2009: LIMA-LAD, SVG-distal RCA, SVG-D1, SVG-RI, SVG-OM1)  -Medical management per [facility]. Thank you for allowing me to participate in your patient's care. Please call me if there are any questions. Johan Shannon D.O.   Cardiac Electrophysiology  [address] [place] [address]

## 2022-07-28 ENCOUNTER — NURSE ONLY (OUTPATIENT)
Dept: NON INVASIVE DIAGNOSTICS | Age: 82
End: 2022-07-28

## 2022-07-28 NOTE — PROGRESS NOTES
Normal In-Office: With Events  1  Events or Alerts: 0  This is a normal in-office diagnostic device check  Battery data was reviewed  Battery status: REBECCA,  Presenting rhythm reviewed  Heart Rate Histograms reviewed  Detection Parameters were evaluated  Additional Notes:  Presenting Vs @ 65 Episodes listed as AF appear to be Vs with early beats. Patient denies any symptms    Created By: Rylee Alejandro 07/28/2022 09:16  Wound Check  1  Device pocket examined  1 week post implant wound check and Device education completed  Wound check was within normal limits (see chart note)  Parameters were reviewed  No changes made  Additional Notes:  Incision well approximated, free of redness, swelling, and drainage. Post implant wound care and remote follow-up discussed. Patient voiced understanding of above. Plan: every 31 day remote transmissions, follow up with Dr. Ragini Treviño in one year.     Created By: Rylee Alejandro 07/28/2022 09:17

## 2022-07-28 NOTE — PATIENT INSTRUCTIONS
You may shower starting now    Call if any signs or symptoms of infection [phone] ext: [phone]  Fevers, chills, redness, swelling or drainage.        [facility] [place]  [address] [place] [address] support (home monitoring) [phone]

## 2022-10-03 ENCOUNTER — HOSPITAL ENCOUNTER (EMERGENCY)
Age: 82
Discharge: HOME | End: 2022-10-03
Attending: STUDENT IN AN ORGANIZED HEALTH CARE EDUCATION/TRAINING PROGRAM
Payer: OTHER GOVERNMENT

## 2022-10-03 VITALS
DIASTOLIC BLOOD PRESSURE: 66 MMHG | SYSTOLIC BLOOD PRESSURE: 122 MMHG | TEMPERATURE: 97.7 F | HEART RATE: 68 BPM | OXYGEN SATURATION: 98 % | RESPIRATION RATE: 16 BRPM

## 2022-10-03 DIAGNOSIS — H60.391 INFECTIVE OTITIS EXTERNA OF RIGHT EAR: Primary | ICD-10-CM

## 2022-10-03 PROCEDURE — 99283 EMERGENCY DEPT VISIT LOW MDM: CPT

## 2022-10-03 RX ORDER — AMOXICILLIN AND CLAVULANATE POTASSIUM 875; 125 MG/1; MG/1
1 TABLET, FILM COATED ORAL 2 TIMES DAILY
Qty: 20 TABLET | Refills: 0 | Status: SHIPPED | OUTPATIENT
Start: 2022-10-03 | End: 2022-10-13

## 2022-10-03 RX ORDER — OFLOXACIN 3 MG/ML
10 SOLUTION AURICULAR (OTIC) 2 TIMES DAILY
Qty: 7 ML | Refills: 0 | Status: SHIPPED | OUTPATIENT
Start: 2022-10-03 | End: 2022-10-10

## 2022-10-03 ASSESSMENT — PAIN - FUNCTIONAL ASSESSMENT: PAIN_FUNCTIONAL_ASSESSMENT: 0-10

## 2022-10-03 ASSESSMENT — ENCOUNTER SYMPTOMS
COUGH: 0
EYE PAIN: 0
VOMITING: 0
SHORTNESS OF BREATH: 0
EYE REDNESS: 0
ABDOMINAL PAIN: 0
NAUSEA: 0

## 2022-10-03 ASSESSMENT — PAIN DESCRIPTION - ORIENTATION: ORIENTATION: RIGHT

## 2022-10-03 ASSESSMENT — PAIN DESCRIPTION - LOCATION: LOCATION: EAR

## 2022-10-03 ASSESSMENT — PAIN DESCRIPTION - DESCRIPTORS: DESCRIPTORS: ACHING

## 2022-10-03 ASSESSMENT — PAIN SCALES - GENERAL: PAINLEVEL_OUTOF10: 10

## 2022-10-03 NOTE — ED PROVIDER NOTES
HPI   Patient presented to emergency department for evaluation of right ear pain since Saturday. He states he was blowing his nose felt a sudden pop. He states he had had surgery on that ear in the past.  Denies any fevers, chills, nausea, vomiting, sore throat. States his hearing is decreased on that side however he wears hearing aids at baseline. He has not tried anything for symptoms, denies any drainage. Review of Systems   Constitutional:  Negative for chills and fever. HENT:  Positive for ear pain. Negative for congestion and ear discharge. Eyes:  Negative for pain and redness. Respiratory:  Negative for cough and shortness of breath. Cardiovascular:  Negative for chest pain and leg swelling. Gastrointestinal:  Negative for abdominal pain, nausea and vomiting. Musculoskeletal:  Negative for neck pain. Skin:  Negative for rash. All other systems reviewed and are negative. Physical Exam  Vitals and nursing note reviewed. HENT:      Head: Normocephalic and atraumatic. Left Ear: Tympanic membrane normal.      Ears:      Comments: Right canal has crusting inside of it, tenderness unable to visualize the TM. No mastoid bone tenderness, no ear proptosis. Nose: Nose normal. No congestion. Mouth/Throat:      Mouth: Mucous membranes are moist.      Pharynx: Oropharynx is clear. Eyes:      Conjunctiva/sclera: Conjunctivae normal.      Pupils: Pupils are equal, round, and reactive to light. Cardiovascular:      Rate and Rhythm: Normal rate. Pulses: Normal pulses. Pulmonary:      Effort: Pulmonary effort is normal.      Breath sounds: Normal breath sounds. Abdominal:      General: There is no distension. Musculoskeletal:         General: Normal range of motion. Right lower leg: No edema. Left lower leg: No edema. Skin:     General: Skin is warm. Capillary Refill: Capillary refill takes less than 2 seconds.    Neurological:      General: No focal deficit present. Mental Status: He is alert. Procedures     MDM  Patient with acute otitis externa on examination. Unable to visualize TM. At this time we will treat with ofloxacin and Augmentin. He was given ear nose and throat referral.  No evidence of mastoiditis, no ear proptosis or mastoid bone tenderness. Hearing decreased at baseline. Vital stable, no fevers no systemic signs of illness. --------------------------------------------- PAST HISTORY ---------------------------------------------  Past Medical History:  has a past medical history of CAD (coronary artery disease), Hyperlipidemia, and Hypertension. Past Surgical History:  has a past surgical history that includes shoulder surgery (Right, 1970) and Insertable Cardiac Monitor (07/14/2022). Social History:  reports that he quit smoking about 45 years ago. His smoking use included cigars. He has never used smokeless tobacco. He reports current alcohol use of about 4.0 standard drinks per week. He reports that he does not use drugs. Family History: family history includes Heart Surgery in his brother. The patients home medications have been reviewed. Allergies: Tadalafil and Atenolol    -------------------------------------------------- RESULTS -------------------------------------------------  Labs:  No results found for this visit on 10/03/22. Radiology:  No orders to display       ------------------------- NURSING NOTES AND VITALS REVIEWED ---------------------------  Date / Time Roomed:  10/3/2022 12:33 PM  ED Bed Assignment:  01/01    The nursing notes within the ED encounter and vital signs as below have been reviewed.    /66   Pulse 68   Temp 97.7 °F (36.5 °C) (Temporal)   Resp 16   SpO2 98%   Oxygen Saturation Interpretation: Normal        --------------------------------- ADDITIONAL PROVIDER NOTES ---------------------------------  At this time the patient is without objective evidence of an acute process requiring hospitalization or inpatient management. They have remained hemodynamically stable throughout their entire ED visit and are stable for discharge with outpatient follow-up. The plan has been discussed in detail and they are aware of the specific conditions for emergent return, as well as the importance of follow-up. Discharge Medication List as of 10/3/2022 12:52 PM        START taking these medications    Details   ofloxacin (FLOXIN OTIC) 0.3 % otic solution Place 10 drops into the right ear 2 times daily for 7 days, Disp-7 mL, R-0Print      amoxicillin-clavulanate (AUGMENTIN) 875-125 MG per tablet Take 1 tablet by mouth 2 times daily for 10 days, Disp-20 tablet, R-0Print             Diagnosis:  1. Infective otitis externa of right ear        Disposition:  Patient's disposition: Discharge to home  Patient's condition is stable.        Quinn Patino DO  Resident  10/03/22 1875

## 2022-10-04 ENCOUNTER — OFFICE VISIT (OUTPATIENT)
Dept: ENT CLINIC | Age: 82
End: 2022-10-04
Payer: OTHER GOVERNMENT

## 2022-10-04 VITALS
WEIGHT: 163 LBS | HEART RATE: 62 BPM | DIASTOLIC BLOOD PRESSURE: 75 MMHG | HEIGHT: 67 IN | SYSTOLIC BLOOD PRESSURE: 142 MMHG | BODY MASS INDEX: 25.58 KG/M2

## 2022-10-04 DIAGNOSIS — B36.9 ACUTE FUNGAL OTITIS EXTERNA: ICD-10-CM

## 2022-10-04 DIAGNOSIS — H66.91 ACUTE OTITIS MEDIA, RIGHT: Primary | ICD-10-CM

## 2022-10-04 DIAGNOSIS — H62.40 ACUTE FUNGAL OTITIS EXTERNA: ICD-10-CM

## 2022-10-04 PROCEDURE — 99204 OFFICE O/P NEW MOD 45 MIN: CPT | Performed by: OTOLARYNGOLOGY

## 2022-10-04 PROCEDURE — 3074F SYST BP LT 130 MM HG: CPT | Performed by: OTOLARYNGOLOGY

## 2022-10-04 PROCEDURE — 3078F DIAST BP <80 MM HG: CPT | Performed by: OTOLARYNGOLOGY

## 2022-10-04 PROCEDURE — 1123F ACP DISCUSS/DSCN MKR DOCD: CPT | Performed by: OTOLARYNGOLOGY

## 2022-10-04 RX ORDER — CIPROFLOXACIN HYDROCHLORIDE 3.5 MG/ML
SOLUTION/ DROPS TOPICAL
Qty: 1 EACH | Refills: 3 | Status: SHIPPED | OUTPATIENT
Start: 2022-10-04

## 2022-10-04 ASSESSMENT — ENCOUNTER SYMPTOMS
RHINORRHEA: 0
SINUS PAIN: 0

## 2022-10-04 NOTE — PROGRESS NOTES
[facility] Otolaryngology  Dr. Nicole Montejo. BELINDA Melchor Ms.Ed. New Consult       Patient Name:  Juan Arzola  :  1940     CHIEF C/O:    Chief Complaint   Patient presents with   Ky Andrea Other     NP ED f/u right ear drum rupture       HISTORY OBTAINED FROM:  patient    HISTORY OF PRESENT ILLNESS:       Ginny Dumont is a 78y.o. year old male, here today for concern of possible right eardrum perforation. Patient was seen in emergency department, with a history of ear pain and drainage, placed on topical antibiotic drops for suspected ear perforation. No current complaints of new or drainage, no complaints of room spinning vertigo, no complaints of headaches vision changes sore throat. Patient has undergone a previous tympanoplasty in 2018 by outside otolaryngologist..  Patient has a known history of baseline hearing loss since not currently quantified.       Past Medical History:   Diagnosis Date    CAD (coronary artery disease)     Bypass  5 vessel [facility]     Hyperlipidemia     Hypertension      Past Surgical History:   Procedure Laterality Date    INSERTABLE CARDIAC MONITOR  2022    [employer] LIN[employer] INsertion (Dr. Chava Wallace)    [employer]    \"1108'V \"  Football injury        Current Outpatient Medications:     ofloxacin (FLOXIN OTIC) 0.3 % otic solution, Place 10 drops into the right ear 2 times daily for 7 days, Disp: 7 mL, Rfl: 0    amoxicillin-clavulanate (AUGMENTIN) 875-125 MG per tablet, Take 1 tablet by mouth 2 times daily for 10 days, Disp: 20 tablet, Rfl: 0    aspirin 81 MG EC tablet, Take 81 mg by mouth, Disp: , Rfl:     CO ENZYME Q-10 PO, Take 1 capsule by mouth daily, Disp: , Rfl:     losartan (COZAAR) 25 MG tablet, TAKE ONE TABLET BY MOUTH EVERY DAY, Disp: , Rfl:     Miconazole Nitrate 2 % SOLN, APPLY A SUFFICIENT AMOUNT EXTERNALLY EVERY DAY TO TOENAILS, Disp: , Rfl:     spironolactone (ALDACTONE) 25 MG tablet, TAKE ONE-HALF TABLET BY MOUTH EVERY DAY **FOR HEART**, Disp: , Rfl:     metoprolol succinate (TOPROL XL) 50 MG extended release tablet, TAKE ONE TABLET BY MOUTH EVERY DAY, Disp: , Rfl:     hydroCHLOROthiazide (HYDRODIURIL) 25 MG tablet, TAKE ONE TABLET BY MOUTH EVERY MORNING, Disp: , Rfl:     atorvastatin (LIPITOR) 80 MG tablet, TAKE ONE TABLET BY MOUTH AT BEDTIME, Disp: , Rfl:     sildenafil (VIAGRA) 100 MG tablet, TAKE ONE TABLET BY MOUTH AN HOUR_BEFORE SEX. (NO MORE THAN 1 DOSE PER 24 HOURS) NO NITRATES, Disp: , Rfl:   Tadalafil and Atenolol  Social History     Tobacco Use    Smoking status: Former     Types: Cigars     Quit date:      Years since quittin.7    Smokeless tobacco: Never   Vaping Use    Vaping Use: Never used   Substance Use Topics    Alcohol use: Yes     Alcohol/week: 4.0 standard drinks     Types: 4 Glasses of wine per week     Comment: 8 oz of wine daily    Drug use: Never     Family History   Problem Relation Age of Onset    Heart Surgery Brother        Review of Systems   Constitutional:  Negative for chills and fever. HENT:  Negative for ear discharge and hearing loss. Respiratory:  Negative for cough and shortness of breath. Cardiovascular:  Negative for chest pain and palpitations. Gastrointestinal:  Negative for vomiting. Skin:  Negative for rash. Allergic/Immunologic: Negative for environmental allergies. Neurological:  Negative for dizziness and headaches. Hematological:  Does not bruise/bleed easily. All other systems reviewed and are negative. BP (!) 142/75 (Site: Left Upper Arm, Position: Sitting, Cuff Size: Medium Adult)   Pulse 62   Ht 5' 7\" (1.702 m)   Wt 163 lb (73.9 kg)   BMI 25.53 kg/m²   Physical Exam  Vitals and nursing note reviewed. Constitutional:       Appearance: He is well-developed. HENT:      Head: Normocephalic and atraumatic. Right Ear: Ear canal normal. Decreased hearing noted. There is impacted cerumen. Tympanic membrane is erythematous.       Left Ear: Tympanic membrane and

## 2022-10-04 NOTE — PROGRESS NOTES
[facility]ol[facility]  Dr. Artemio Dunn. BELINDA Melchor Ms.Ed. New Consult       Patient Name:  Carlos Amezcua  :  1940     CHIEF C/O:    Chief Complaint   Patient presents with    Other     NP ED f/u right ear drum rupture       HISTORY OBTAINED FROM:  patient    HISTORY OF PRESENT ILLNESS:       Bobbi Andrea is a 78y.o. year old male, here today for complaints of R ear pain and otorrhea. Pt states pain began on Friday when he was blowing his nose and suddenly felt a pop. He states he has had drainage and pain since that time and went to an urgent care on Lei 10/2. He was told he has an ear infection and a perforated TM on the right. He states he was prescribed oral augmentin and floxin drops. Due to financial strain he did not get the drops but did  his oral medication and has been taking it as prescribed. He states it is still painful but seems to be getting better. At baseline pt. wears hearing aids BL and had a R tympanoplasty in 2018.        Past Medical History:   Diagnosis Date    CAD (coronary artery disease)     Bypass  5 vessel [facility]     Hyperlipidemia     Hypertension      Past Surgical History:   Procedure Laterality Date    INSERTABLE CARDIAC MONITOR  2022    [employer] LINQ INsertion (Dr. Anna Hunter)    [facility]    \"4080'C \"  Football injury        Current Outpatient Medications:     ciprofloxacin (CILOXAN) 0.3 % ophthalmic solution, 4 gtts right ear BID for 7 days, Disp: 1 each, Rfl: 3    ofloxacin (FLOXIN OTIC) 0.3 % otic solution, Place 10 drops into the right ear 2 times daily for 7 days, Disp: 7 mL, Rfl: 0    amoxicillin-clavulanate (AUGMENTIN) 875-125 MG per tablet, Take 1 tablet by mouth 2 times daily for 10 days, Disp: 20 tablet, Rfl: 0    aspirin 81 MG EC tablet, Take 81 mg by mouth, Disp: , Rfl:     CO ENZYME Q-10 PO, Take 1 capsule by mouth daily, Disp: , Rfl:     losartan (COZAAR) 25 MG tablet, TAKE ONE TABLET BY MOUTH EVERY DAY, Disp: , Rfl:     Miconazole Nitrate 2 % SOLN, APPLY A SUFFICIENT AMOUNT EXTERNALLY EVERY DAY TO TOENAILS, Disp: , Rfl:     spironolactone (ALDACTONE) 25 MG tablet, TAKE ONE-HALF TABLET BY MOUTH EVERY DAY **FOR HEART**, Disp: , Rfl:     metoprolol succinate (TOPROL XL) 50 MG extended release tablet, TAKE ONE TABLET BY MOUTH EVERY DAY, Disp: , Rfl:     hydroCHLOROthiazide (HYDRODIURIL) 25 MG tablet, TAKE ONE TABLET BY MOUTH EVERY MORNING, Disp: , Rfl:     atorvastatin (LIPITOR) 80 MG tablet, TAKE ONE TABLET BY MOUTH AT BEDTIME, Disp: , Rfl:     sildenafil (VIAGRA) 100 MG tablet, TAKE ONE TABLET BY MOUTH AN HOUR_BEFORE SEX. (NO MORE THAN 1 DOSE PER 24 HOURS) NO NITRATES, Disp: , Rfl:   Tadalafil and Atenolol  Social History     Tobacco Use    Smoking status: Former     Types: Cigars     Quit date:      Years since quittin.7    Smokeless tobacco: Never   Vaping Use    Vaping Use: Never used   Substance Use Topics    Alcohol use: Yes     Alcohol/week: 4.0 standard drinks     Types: 4 Glasses of wine per week     Comment: 8 oz of wine daily    Drug use: Never     Family History   Problem Relation Age of Onset    Heart Surgery Brother        Review of Systems   Constitutional:  Negative for fever. HENT:  Positive for ear discharge, ear pain and hearing loss. Negative for congestion, postnasal drip, rhinorrhea and sinus pain. BP (!) 142/75 (Site: Left Upper Arm, Position: Sitting, Cuff Size: Medium Adult)   Pulse 62   Ht 5' 7\" (1.702 m)   Wt 163 lb (73.9 kg)   BMI 25.53 kg/m²   Physical Exam  Constitutional:       Appearance: He is normal weight. HENT:      Right Ear: External ear normal.      Left Ear: Tympanic membrane, ear canal and external ear normal.      Ears:      Comments: Right canal filled with purulent white chunky drainage. TM not visualized. Nose: Nose normal.      Mouth/Throat:      Mouth: Mucous membranes are moist.      Pharynx: Oropharynx is clear.    Musculoskeletal:      Cervical back: Normal range of motion and neck supple. Neurological:      Mental Status: He is alert. IMPRESSION/PLAN:  Acute otitis media of the right ear   - Right ear canal was cleaned with suction in office  - ciloxan drops 4 times BID in the right ear   - continue augmentin as prescribed   - keep area dry and avoid placing items or fingers into the canal   - fu in 1-2 weeks to ensure resolution of infection    Louie Edinson OMS IV- [facility]  10/4/22 1010    Dr. Melia Gillespie Otolaryngology/Facial Plastic Surgery Residency  Associate Clinical Professor:  Destini Torres, [facility]

## 2022-10-05 ENCOUNTER — TELEPHONE (OUTPATIENT)
Dept: NON INVASIVE DIAGNOSTICS | Age: 82
End: 2022-10-05

## 2022-10-05 DIAGNOSIS — I48.91 ATRIAL FIBRILLATION, UNSPECIFIED TYPE (HCC): Primary | ICD-10-CM

## 2022-10-05 NOTE — TELEPHONE ENCOUNTER
Patient called in to check what day/time the arrhythmia was detected on his IRL. Explained to the patient that per the contents of the [facility] report, he sustained over 30 episodes from 09/18/2022 through 09/29/2022. The patient states that Friday 09/30/2022 he was at a football game and suffered some visual disturbances as well. States he will get his bloodwork done at the [place].

## 2022-10-05 NOTE — TELEPHONE ENCOUNTER
----- Message from Loraine Holstein sent at 10/3/2022  8:27 AM EDT -----  Regarding: FW: AF    ----- Message -----  From: Megan Lowery DO  Sent: 10/1/2022   8:58 PM EDT  To: Planada Holstein  Subject: AF                                               AF detected on ILR remote. No prior labs available. Recommend:  1. CBC, CMP, Mg, and TSH  2. Review cost of [address] options with patient based on his insurance (apixaban, xarelto, dabigatran, edoxaban). Then, let me know which he would like to start. 3. Please contact me to send script AFTER above completed.     Megan Lowery DO

## 2022-10-05 NOTE — TELEPHONE ENCOUNTER
----- Message from Hue Rendon sent at 10/3/2022  8:27 AM EDT -----  Regarding: FW: AF    ----- Message -----  From: Jonny Arellano DO  Sent: 10/1/2022   8:58 PM EDT  To: Hue Rendon  Subject: AF                                               AF detected on ILR remote. No prior labs available. Recommend:  1. CBC, CMP, Mg, and TSH  2. Review cost of [address] options with patient based on his insurance (apixaban, xarelto, dabigatran, edoxaban). Then, let me know which he would like to start. 3. Please contact me to send script AFTER above completed.     Jonny Arellano DO

## 2022-10-05 NOTE — TELEPHONE ENCOUNTER
Called and spoke to patient about detection of atrial fibrillation by his ILR. Instructed him of Dr Magen Davis recommendation to get labwork done. States he understands, but wants to call around to see where a lab draw would be cheaper.

## 2022-10-18 ENCOUNTER — OFFICE VISIT (OUTPATIENT)
Dept: ENT CLINIC | Age: 82
End: 2022-10-18
Payer: OTHER GOVERNMENT

## 2022-10-18 VITALS
HEIGHT: 67 IN | SYSTOLIC BLOOD PRESSURE: 126 MMHG | WEIGHT: 168.8 LBS | HEART RATE: 81 BPM | BODY MASS INDEX: 26.49 KG/M2 | DIASTOLIC BLOOD PRESSURE: 67 MMHG

## 2022-10-18 DIAGNOSIS — H62.40 ACUTE FUNGAL OTITIS EXTERNA: ICD-10-CM

## 2022-10-18 DIAGNOSIS — H66.91 ACUTE OTITIS MEDIA, RIGHT: Primary | ICD-10-CM

## 2022-10-18 DIAGNOSIS — B36.9 ACUTE FUNGAL OTITIS EXTERNA: ICD-10-CM

## 2022-10-18 PROCEDURE — 3074F SYST BP LT 130 MM HG: CPT | Performed by: OTOLARYNGOLOGY

## 2022-10-18 PROCEDURE — 3078F DIAST BP <80 MM HG: CPT | Performed by: OTOLARYNGOLOGY

## 2022-10-18 PROCEDURE — 99213 OFFICE O/P EST LOW 20 MIN: CPT | Performed by: OTOLARYNGOLOGY

## 2022-10-18 PROCEDURE — 1123F ACP DISCUSS/DSCN MKR DOCD: CPT | Performed by: OTOLARYNGOLOGY

## 2022-10-18 RX ORDER — CLOTRIMAZOLE AND BETAMETHASONE DIPROPIONATE 10; .64 MG/G; MG/G
CREAM TOPICAL
Qty: 1 EACH | Refills: 3 | Status: SHIPPED | OUTPATIENT
Start: 2022-10-18

## 2022-10-18 NOTE — PROGRESS NOTES
Rt ear    Now fingal    Cl;ernaed  Topical nystatinh  Lotrisone  See in 2 weeks        [facility] Otolaryngology  Dr. Benito Penaloza. Joshua Enriquez. Ms.Ed        Patient Name:  Alma Delia Rollins  :  1940     CHIEF C/O:    Chief Complaint   Patient presents with    Follow-up     2 wk TM perf,        HISTORY OBTAINED FROM:  patient    HISTORY OF PRESENT ILLNESS:       Zeferino Friend is a 78y.o. year old male, here today for follow up of .history of significant right-sided ear infection with perforation and associated drainage placed on topical antibiotic therapy, is here with continued ear plugging no difficulty with the opposite ear, no complaints of bleeding from the ear, there is no associated hearing loss there is no associated vertigo no associated no tinnitus. Patient has finished all medical therapy and drops at this point. Past Medical History:   Diagnosis Date    CAD (coronary artery disease)     Bypass  5 vessel [facility]     Hyperlipidemia     Hypertension      Past Surgical History:   Procedure Laterality Date    INSERTABLE CARDIAC MONITOR  2022    Medtr[employer] LINQ INsertion (Dr. Amarilys Gage)    [employer]    \"1970's \"  Football injury        Current Outpatient Medications:     clotrimazole-betamethasone (LOTRISONE) 1-0.05 % cream, Apply topically 2 times daily. , Disp: 1 each, Rfl: 3    ciprofloxacin (CILOXAN) 0.3 % ophthalmic solution, 4 gtts right ear BID for 7 days, Disp: 1 each, Rfl: 3    aspirin 81 MG EC tablet, Take 81 mg by mouth, Disp: , Rfl:     CO ENZYME Q-10 PO, Take 1 capsule by mouth daily, Disp: , Rfl:     losartan (COZAAR) 25 MG tablet, TAKE ONE TABLET BY MOUTH EVERY DAY, Disp: , Rfl:     Miconazole Nitrate 2 % SOLN, APPLY A SUFFICIENT AMOUNT EXTERNALLY EVERY DAY TO TOENAILS, Disp: , Rfl:     spironolactone (ALDACTONE) 25 MG tablet, TAKE ONE-HALF TABLET BY MOUTH EVERY DAY **FOR HEART**, Disp: , Rfl:     metoprolol succinate (TOPROL XL) 50 MG extended release tablet, TAKE ONE TABLET BY MOUTH EVERY DAY, Disp: , Rfl:     hydroCHLOROthiazide (HYDRODIURIL) 25 MG tablet, TAKE ONE TABLET BY MOUTH EVERY MORNING, Disp: , Rfl:     atorvastatin (LIPITOR) 80 MG tablet, TAKE ONE TABLET BY MOUTH AT BEDTIME, Disp: , Rfl:     sildenafil (VIAGRA) 100 MG tablet, TAKE ONE TABLET BY MOUTH AN HOUR_BEFORE SEX. (NO MORE THAN 1 DOSE PER 24 HOURS) NO NITRATES, Disp: , Rfl:   Tadalafil and Atenolol  Social History     Tobacco Use    Smoking status: Former     Types: Cigars     Quit date:      Years since quittin.8    Smokeless tobacco: Never   Vaping Use    Vaping Use: Never used   Substance Use Topics    Alcohol use: Yes     Alcohol/week: 4.0 standard drinks     Types: 4 Glasses of wine per week     Comment: 8 oz of wine daily    Drug use: Never     Family History   Problem Relation Age of Onset    Heart Surgery Brother        Review of Systems   Constitutional:  Negative for chills and fever. HENT:  Positive for hearing loss. Negative for ear discharge, ear pain, postnasal drip and sore throat. Respiratory:  Negative for cough and shortness of breath. Cardiovascular:  Negative for chest pain and palpitations. Gastrointestinal:  Negative for vomiting. Skin:  Negative for rash. Allergic/Immunologic: Negative for environmental allergies. Neurological:  Negative for dizziness and headaches. Hematological:  Does not bruise/bleed easily. All other systems reviewed and are negative. /67   Pulse 81   Ht 5' 7\" (1.702 m)   Wt 168 lb 12.8 oz (76.6 kg)   BMI 26.44 kg/m²   Physical Exam  Vitals and nursing note reviewed. Constitutional:       Appearance: He is well-developed. HENT:      Head: Normocephalic. Right Ear: Ear canal and external ear normal.      Left Ear: Decreased hearing noted. Drainage and swelling present. There is impacted cerumen. Tympanic membrane is perforated.       Ears:     Eyes:      Conjunctiva/sclera: Conjunctivae normal. Pupils: Pupils are equal, round, and reactive to light. Cardiovascular:      Rate and Rhythm: Normal rate. Pulmonary:      Effort: Pulmonary effort is normal. No respiratory distress. Breath sounds: Normal breath sounds. Abdominal:      General: There is no distension. Tenderness: There is no abdominal tenderness. There is no guarding. Musculoskeletal:         General: Normal range of motion. Cervical back: Normal range of motion and neck supple. Skin:     General: Skin is warm and dry. Neurological:      Mental Status: He is alert and oriented to person, place, and time. Psychiatric:         Mood and Affect: Mood normal.         Behavior: Behavior normal.         Thought Content: Thought content normal.         Judgment: Judgment normal.       IMPRESSION/PLAN:  Patient seen and examined, underwent debridement under microscopic assistance for what appears to have component of a fungal otitis externa which was treated with both topical nystatin, as well as clotrimazole be prescribed for a period of 7 to 10 days. Patient will follow-up in 2 weeks for repeat debridement risk and benefits again reviewed today. Dr. Magi Martin.  Otolaryngology Facial Plastic Surgery  :  [facility] Otolaryngology/Facial Plastic Surgery Residency  Associate Clinical Professor:  Sharyon Castleman, [facility]

## 2022-10-24 ENCOUNTER — TELEPHONE (OUTPATIENT)
Dept: ENT CLINIC | Age: 82
End: 2022-10-24

## 2022-10-24 NOTE — TELEPHONE ENCOUNTER
Pt called to r/s his 2 week f/u with Dr Eugenia Ardon. Pt was scheduled at next available on 11-30-22. Please review and let pt know if he needs seen sooner.  [phone]    2 weeks after lotrisone

## 2022-10-27 ASSESSMENT — ENCOUNTER SYMPTOMS
SHORTNESS OF BREATH: 0
COUGH: 0
VOMITING: 0

## 2022-11-08 ENCOUNTER — OFFICE VISIT (OUTPATIENT)
Dept: ENT CLINIC | Age: 82
End: 2022-11-08
Payer: OTHER GOVERNMENT

## 2022-11-08 VITALS
WEIGHT: 162 LBS | BODY MASS INDEX: 25.43 KG/M2 | HEART RATE: 66 BPM | SYSTOLIC BLOOD PRESSURE: 149 MMHG | DIASTOLIC BLOOD PRESSURE: 70 MMHG | HEIGHT: 67 IN

## 2022-11-08 DIAGNOSIS — H60.391 OTHER INFECTIVE ACUTE OTITIS EXTERNA OF RIGHT EAR: Primary | ICD-10-CM

## 2022-11-08 DIAGNOSIS — H92.11 OTORRHEA OF RIGHT EAR: ICD-10-CM

## 2022-11-08 DIAGNOSIS — H92.01 RIGHT EAR PAIN: ICD-10-CM

## 2022-11-08 PROCEDURE — 3078F DIAST BP <80 MM HG: CPT | Performed by: OTOLARYNGOLOGY

## 2022-11-08 PROCEDURE — 99213 OFFICE O/P EST LOW 20 MIN: CPT | Performed by: OTOLARYNGOLOGY

## 2022-11-08 PROCEDURE — 1123F ACP DISCUSS/DSCN MKR DOCD: CPT | Performed by: OTOLARYNGOLOGY

## 2022-11-08 PROCEDURE — 3074F SYST BP LT 130 MM HG: CPT | Performed by: OTOLARYNGOLOGY

## 2022-11-08 PROCEDURE — 69210 REMOVE IMPACTED EAR WAX UNI: CPT | Performed by: OTOLARYNGOLOGY

## 2022-11-08 ASSESSMENT — ENCOUNTER SYMPTOMS
RHINORRHEA: 0
SINUS PAIN: 0

## 2022-11-08 NOTE — PROGRESS NOTES
[id number] [facility] Otolaryngology  Dr. Mike Tolbert. BELINDA Melchor Ms.Ed. Patient Name:  Elizabeth Fall  :  1940     CHIEF C/O:    Chief Complaint   Patient presents with    Follow-up     2wk f/u after lotrisone       HISTORY OBTAINED FROM:  patient    HISTORY OF PRESENT ILLNESS:       Elza Madison is a 78y.o. year old male, here today for complaints of R ear pain and otorrhea for the past 1 month, presents for follow up. He has in the past done ciprofloxacin drops, oral augmentin and then given lotrisone cream at the last visit. He has used lortisone cream for the last 2 weeks with no improvement of symptoms. He has persistent right aural fullness, itchiness, pain and otorrhea. Pt wears hearing aids BL and had a R tympanoplasty in 2018 at [facility]. Past Medical History:   Diagnosis Date    CAD (coronary artery disease)     Bypass  5 vessel [facility]     Hyperlipidemia     Hypertension      Past Surgical History:   Procedure Laterality Date    INSERTABLE CARDIAC MONITOR  2022    [employer]tr[employer] LINQ INsertion (Dr. Kita Quevedo)    [facility]    \"1970's \"  Football injury        Current Outpatient Medications:     clotrimazole-betamethasone (LOTRISONE) 1-0.05 % cream, Apply topically 2 times daily. , Disp: 1 each, Rfl: 3    ciprofloxacin (CILOXAN) 0.3 % ophthalmic solution, 4 gtts right ear BID for 7 days, Disp: 1 each, Rfl: 3    aspirin 81 MG EC tablet, Take 81 mg by mouth, Disp: , Rfl:     CO ENZYME Q-10 PO, Take 1 capsule by mouth daily, Disp: , Rfl:     losartan (COZAAR) 25 MG tablet, TAKE ONE TABLET BY MOUTH EVERY DAY, Disp: , Rfl:     Miconazole Nitrate 2 % SOLN, APPLY A SUFFICIENT AMOUNT EXTERNALLY EVERY DAY TO TOENAILS, Disp: , Rfl:     spironolactone (ALDACTONE) 25 MG tablet, TAKE ONE-HALF TABLET BY MOUTH EVERY DAY **FOR HEART**, Disp: , Rfl:     metoprolol succinate (TOPROL XL) 50 MG extended release tablet, TAKE ONE TABLET BY MOUTH EVERY DAY, Disp: , Rfl:     hydroCHLOROthiazide (HYDRODIURIL) 25 MG tablet, TAKE ONE TABLET BY MOUTH EVERY MORNING, Disp: , Rfl:     atorvastatin (LIPITOR) 80 MG tablet, TAKE ONE TABLET BY MOUTH AT BEDTIME, Disp: , Rfl:     sildenafil (VIAGRA) 100 MG tablet, TAKE ONE TABLET BY MOUTH AN HOUR_BEFORE SEX. (NO MORE THAN 1 DOSE PER 24 HOURS) NO NITRATES, Disp: , Rfl:   Tadalafil and Atenolol  Social History     Tobacco Use    Smoking status: Former     Types: Cigars     Quit date:      Years since quittin.8    Smokeless tobacco: Never   Vaping Use    Vaping Use: Never used   Substance Use Topics    Alcohol use: Yes     Alcohol/week: 4.0 standard drinks     Types: 4 Glasses of wine per week     Comment: 8 oz of wine daily    Drug use: Never     Family History   Problem Relation Age of Onset    Heart Surgery Brother        Review of Systems   Constitutional:  Negative for fever. HENT:  Positive for ear discharge, ear pain and hearing loss. Negative for congestion, postnasal drip, rhinorrhea and sinus pain. BP (!) 149/70 (Site: Left Upper Arm, Position: Sitting, Cuff Size: Medium Adult)   Pulse 66   Ht 5' 7\" (1.702 m)   Wt 162 lb (73.5 kg)   BMI 25.37 kg/m²   Physical Exam  Constitutional:       Appearance: He is normal weight. HENT:      Right Ear: External ear normal.      Left Ear: Tympanic membrane, ear canal and external ear normal.      Ears:      Comments: Right canal filled with purulent white debris, drainage. TM not visualized. Ear  and edematous     Nose: Nose normal.      Mouth/Throat:      Mouth: Mucous membranes are moist.      Pharynx: Oropharynx is clear. Musculoskeletal:      Cervical back: Normal range of motion and neck supple. Neurological:      Mental Status: He is alert. Ear canal debridement Procedure     Auditory canal(s) right ear completely obstructed with white debris. A microscope was used due to deep impaction of the cerumen. Cerumen was gently removed using suction.  Tympanic membrane was visualized and noted with post surgical changes, no identifiable landmarks.  Auditory canals appear baseline edematous       IMPRESSION/PLAN:  Acute otitis externa of the right ear   - Right ear canal was cleaned with suction in office  - cultured the otorrhea today    - nystatin powder applied in the ear    - keep area dry and avoid placing items or fingers into the canal, keep hearing aids out   - fu in 1-2 weeks to ensure resolution of infection

## 2022-11-09 DIAGNOSIS — H60.391 OTHER INFECTIVE ACUTE OTITIS EXTERNA OF RIGHT EAR: ICD-10-CM

## 2022-11-09 DIAGNOSIS — H92.11 OTORRHEA OF RIGHT EAR: ICD-10-CM

## 2022-11-14 LAB
CULTURE EAR OR EYE: ABNORMAL
CULTURE EAR OR EYE: ABNORMAL
GRAM STAIN RESULT: ABNORMAL
ORGANISM: ABNORMAL

## 2022-11-14 ASSESSMENT — ENCOUNTER SYMPTOMS
COUGH: 0
SORE THROAT: 0
SHORTNESS OF BREATH: 0
VOMITING: 0

## 2022-11-18 ENCOUNTER — OFFICE VISIT (OUTPATIENT)
Dept: ENT CLINIC | Age: 82
End: 2022-11-18
Payer: OTHER GOVERNMENT

## 2022-11-18 VITALS
HEART RATE: 67 BPM | SYSTOLIC BLOOD PRESSURE: 138 MMHG | DIASTOLIC BLOOD PRESSURE: 71 MMHG | WEIGHT: 160 LBS | HEIGHT: 67 IN | BODY MASS INDEX: 25.11 KG/M2

## 2022-11-18 DIAGNOSIS — B36.9 ACUTE FUNGAL OTITIS EXTERNA: Primary | ICD-10-CM

## 2022-11-18 DIAGNOSIS — H62.40 ACUTE FUNGAL OTITIS EXTERNA: Primary | ICD-10-CM

## 2022-11-18 PROCEDURE — 3074F SYST BP LT 130 MM HG: CPT | Performed by: OTOLARYNGOLOGY

## 2022-11-18 PROCEDURE — 1123F ACP DISCUSS/DSCN MKR DOCD: CPT | Performed by: OTOLARYNGOLOGY

## 2022-11-18 PROCEDURE — 3078F DIAST BP <80 MM HG: CPT | Performed by: OTOLARYNGOLOGY

## 2022-11-18 PROCEDURE — 99213 OFFICE O/P EST LOW 20 MIN: CPT | Performed by: OTOLARYNGOLOGY

## 2022-11-18 PROCEDURE — 69210 REMOVE IMPACTED EAR WAX UNI: CPT | Performed by: OTOLARYNGOLOGY

## 2022-11-18 RX ORDER — CLOTRIMAZOLE AND BETAMETHASONE DIPROPIONATE 10; .64 MG/G; MG/G
CREAM TOPICAL
Qty: 1 EACH | Refills: 0 | Status: SHIPPED | OUTPATIENT
Start: 2022-11-18

## 2022-12-06 ENCOUNTER — OFFICE VISIT (OUTPATIENT)
Dept: ENT CLINIC | Age: 82
End: 2022-12-06
Payer: OTHER GOVERNMENT

## 2022-12-06 VITALS — BODY MASS INDEX: 23.98 KG/M2 | HEIGHT: 69 IN | WEIGHT: 161.9 LBS

## 2022-12-06 DIAGNOSIS — H60.391 OTHER INFECTIVE ACUTE OTITIS EXTERNA OF RIGHT EAR: ICD-10-CM

## 2022-12-06 DIAGNOSIS — H92.01 RIGHT EAR PAIN: ICD-10-CM

## 2022-12-06 DIAGNOSIS — H62.40 ACUTE FUNGAL OTITIS EXTERNA: Primary | ICD-10-CM

## 2022-12-06 DIAGNOSIS — H92.11 OTORRHEA OF RIGHT EAR: ICD-10-CM

## 2022-12-06 DIAGNOSIS — B36.9 ACUTE FUNGAL OTITIS EXTERNA: Primary | ICD-10-CM

## 2022-12-06 PROCEDURE — 1123F ACP DISCUSS/DSCN MKR DOCD: CPT | Performed by: OTOLARYNGOLOGY

## 2022-12-06 PROCEDURE — 99213 OFFICE O/P EST LOW 20 MIN: CPT | Performed by: OTOLARYNGOLOGY

## 2022-12-06 ASSESSMENT — ENCOUNTER SYMPTOMS
SINUS PAIN: 0
VOICE CHANGE: 0
COUGH: 0
COUGH: 0
RHINORRHEA: 0
SHORTNESS OF BREATH: 0
SHORTNESS OF BREATH: 0
VOMITING: 0
TROUBLE SWALLOWING: 0
VOMITING: 0

## 2022-12-06 NOTE — PROGRESS NOTES
[facility]  Dr. Alethea Mccoy. Julio C Merritt. Ms.Ed        Patient Name:  Torres Pierre  :  1940     CHIEF C/O:    Chief Complaint   Patient presents with    Ear Problem     Otitis externa; has been using the brynn but doesn't feel any better; has some bleeding, not a lot. Just some when applying the brynn on a qtip. Cold air and wind causes pain-- will use cotton in ear when he goes out for long periods of time       HISTORY OBTAINED FROM:  patient    HISTORY OF PRESENT ILLNESS:       Gulshan Montgomery is a 78y.o. year old male, here today for follow up of fungal and bacterial otitis externa after using treatment of Lotrisone, debridement and nystatin Patient symptoms have improved. Has some minimal bleeding on a qtip after applying cream. Patient has a known history of hearing loss and wears hearing aids      Past Medical History:   Diagnosis Date    CAD (coronary artery disease)     Bypass  5 vessel [facility]     Hyperlipidemia     Hypertension      Past Surgical History:   Procedure Laterality Date    INSERTABLE CARDIAC MONITOR  2022    [employer] LINQ INsertion (Dr. Ladan Fink)    [address] [facility]    \"1970's \"  Football injury        Current Outpatient Medications:     clotrimazole-betamethasone (LOTRISONE) 1-0.05 % cream, Apply topically 2 times daily. , Disp: 1 each, Rfl: 0    clotrimazole-betamethasone (LOTRISONE) 1-0.05 % cream, Apply topically 2 times daily. , Disp: 1 each, Rfl: 3    ciprofloxacin (CILOXAN) 0.3 % ophthalmic solution, 4 gtts right ear BID for 7 days, Disp: 1 each, Rfl: 3    aspirin 81 MG EC tablet, Take 81 mg by mouth, Disp: , Rfl:     CO ENZYME Q-10 PO, Take 1 capsule by mouth daily, Disp: , Rfl:     losartan (COZAAR) 25 MG tablet, TAKE ONE TABLET BY MOUTH EVERY DAY, Disp: , Rfl:     Miconazole Nitrate 2 % SOLN, APPLY A SUFFICIENT AMOUNT EXTERNALLY EVERY DAY TO TOENAILS, Disp: , Rfl:     spironolactone (ALDACTONE) 25 MG tablet, TAKE ONE-HALF TABLET BY MOUTH EVERY DAY **FOR HEART**, Disp: , Rfl:     metoprolol succinate (TOPROL XL) 50 MG extended release tablet, TAKE ONE TABLET BY MOUTH EVERY DAY, Disp: , Rfl:     hydroCHLOROthiazide (HYDRODIURIL) 25 MG tablet, TAKE ONE TABLET BY MOUTH EVERY MORNING, Disp: , Rfl:     atorvastatin (LIPITOR) 80 MG tablet, TAKE ONE TABLET BY MOUTH AT BEDTIME, Disp: , Rfl:     sildenafil (VIAGRA) 100 MG tablet, TAKE ONE TABLET BY MOUTH AN HOUR_BEFORE SEX. (NO MORE THAN 1 DOSE PER 24 HOURS) NO NITRATES, Disp: , Rfl:   Tadalafil and Atenolol  Social History     Tobacco Use    Smoking status: Former     Types: Cigars     Quit date:      Years since quittin.9    Smokeless tobacco: Never   Vaping Use    Vaping Use: Never used   Substance Use Topics    Alcohol use: Yes     Alcohol/week: 4.0 standard drinks     Types: 4 Glasses of wine per week     Comment: 8 oz of wine daily    Drug use: Never     Family History   Problem Relation Age of Onset    Heart Surgery Brother        Review of Systems   Constitutional:  Negative for chills and fever. HENT:  Positive for ear pain and hearing loss. Negative for congestion, ear discharge, rhinorrhea, sinus pain, tinnitus, trouble swallowing and voice change. Respiratory:  Negative for cough and shortness of breath. Cardiovascular:  Negative for chest pain and palpitations. Gastrointestinal:  Negative for vomiting. Skin:  Negative for rash. Allergic/Immunologic: Negative for environmental allergies. Neurological:  Negative for dizziness and headaches. Hematological:  Does not bruise/bleed easily. All other systems reviewed and are negative. Ht 5' 8.5\" (1.74 m)   Wt 161 lb 14.4 oz (73.4 kg)   BMI 24.26 kg/m²   Physical Exam  Vitals and nursing note reviewed. Constitutional:       Appearance: He is well-developed. HENT:      Head: Normocephalic and atraumatic. No contusion, masses or laceration. Jaw: No tenderness or swelling.       Right Ear: Tympanic membrane and ear canal normal. No drainage. No middle ear effusion. There is no impacted cerumen. Tympanic membrane is not perforated. Left Ear: Tympanic membrane and ear canal normal. There is no impacted cerumen. Nose: No congestion or rhinorrhea. Mouth/Throat:      Mouth: Mucous membranes are moist.      Pharynx: No oropharyngeal exudate or posterior oropharyngeal erythema. Eyes:      Pupils: Pupils are equal, round, and reactive to light. Neck:      Thyroid: No thyromegaly. Trachea: No tracheal deviation. Cardiovascular:      Rate and Rhythm: Normal rate. Pulmonary:      Effort: Pulmonary effort is normal. No respiratory distress. Musculoskeletal:         General: Normal range of motion. Cervical back: Normal range of motion. Lymphadenopathy:      Cervical: No cervical adenopathy. Skin:     General: Skin is warm. Findings: No erythema. Neurological:      Mental Status: He is alert. Cranial Nerves: No cranial nerve deficit. IMPRESSION/PLAN:  Keep ear clean and dry  Ok to wear hearing aid  Stop medication in ears  Follow up prn    Dr. Woodrow Riley.  Otolaryngology Facial Plastic Surgery  :  [address] [place] [address] Otolaryngology/Facial Plastic Surgery Residency  Associate Clinical Professor:  [facility]

## 2022-12-07 NOTE — PROGRESS NOTES
[facility] Otolaryngology  Dr. Melgar Jarvis. Jeremiah Cabrera. Ms.Ed        Patient Name:  Jt Mrax  :  1940     CHIEF C/O:    Chief Complaint   Patient presents with    Follow-up     Patient here for right ear culture results. Patient states that it is still bleeding, and is getting worse       HISTORY OBTAINED FROM:  patient    HISTORY OF PRESENT ILLNESS:       Lianne Alejandre is a 78y.o. year old male, here today for follow up of for follow-up for history of right-sided otitis which associated with chronic otorrhea. Patient states he has been seen multiple times pleasant antibiotics both orally and topically, underwent cultures and is here for repeat reevaluation. Denies any new vertigo. Denies any tinnitus. Denies any sore throat fever or chills. Denies any current complaints of headaches nausea vomiting chest pain at this time. All results reviewed with the patient today. Past Medical History:   Diagnosis Date    CAD (coronary artery disease)     Bypass  5 vessel [facility]     Hyperlipidemia     Hypertension      Past Surgical History:   Procedure Laterality Date    INSERTABLE CARDIAC MONITOR  2022    [employer] LINQ INsertion (Dr. Casey Fisher)    [address] [facility]    \"1970's \"  Football injury        Current Outpatient Medications:     clotrimazole-betamethasone (LOTRISONE) 1-0.05 % cream, Apply topically 2 times daily. , Disp: 1 each, Rfl: 0    clotrimazole-betamethasone (LOTRISONE) 1-0.05 % cream, Apply topically 2 times daily. , Disp: 1 each, Rfl: 3    ciprofloxacin (CILOXAN) 0.3 % ophthalmic solution, 4 gtts right ear BID for 7 days, Disp: 1 each, Rfl: 3    aspirin 81 MG EC tablet, Take 81 mg by mouth, Disp: , Rfl:     CO ENZYME Q-10 PO, Take 1 capsule by mouth daily, Disp: , Rfl:     losartan (COZAAR) 25 MG tablet, TAKE ONE TABLET BY MOUTH EVERY DAY, Disp: , Rfl:     Miconazole Nitrate 2 % SOLN, APPLY A SUFFICIENT AMOUNT EXTERNALLY EVERY DAY TO TOENAILS, Disp: , Rfl:     spironolactone (ALDACTONE) 25 MG tablet, TAKE ONE-HALF TABLET BY MOUTH EVERY DAY **FOR HEART**, Disp: , Rfl:     metoprolol succinate (TOPROL XL) 50 MG extended release tablet, TAKE ONE TABLET BY MOUTH EVERY DAY, Disp: , Rfl:     hydroCHLOROthiazide (HYDRODIURIL) 25 MG tablet, TAKE ONE TABLET BY MOUTH EVERY MORNING, Disp: , Rfl:     atorvastatin (LIPITOR) 80 MG tablet, TAKE ONE TABLET BY MOUTH AT BEDTIME, Disp: , Rfl:     sildenafil (VIAGRA) 100 MG tablet, TAKE ONE TABLET BY MOUTH AN HOUR_BEFORE SEX. (NO MORE THAN 1 DOSE PER 24 HOURS) NO NITRATES, Disp: , Rfl:   Tadalafil and Atenolol  Social History     Tobacco Use    Smoking status: Former     Types: Cigars     Quit date:      Years since quittin.9    Smokeless tobacco: Never   Vaping Use    Vaping Use: Never used   Substance Use Topics    Alcohol use: Yes     Alcohol/week: 4.0 standard drinks     Types: 4 Glasses of wine per week     Comment: 8 oz of wine daily    Drug use: Never     Family History   Problem Relation Age of Onset    Heart Surgery Brother        Review of Systems   Constitutional:  Negative for chills and fever. HENT:  Negative for ear discharge and hearing loss. Respiratory:  Negative for cough and shortness of breath. Cardiovascular:  Negative for chest pain and palpitations. Gastrointestinal:  Negative for vomiting. Skin:  Negative for rash. Allergic/Immunologic: Negative for environmental allergies. Neurological:  Negative for dizziness and headaches. Hematological:  Does not bruise/bleed easily. All other systems reviewed and are negative. /71   Pulse 67   Ht 5' 7\" (1.702 m)   Wt 160 lb (72.6 kg)   BMI 25.06 kg/m²   Physical Exam  Vitals and nursing note reviewed. Constitutional:       Appearance: He is well-developed. HENT:      Head: Normocephalic and atraumatic.       Right Ear: Tympanic membrane and ear canal normal.      Left Ear: Tympanic membrane and ear canal normal.      Nose: No congestion or rhinorrhea. Eyes:      Pupils: Pupils are equal, round, and reactive to light. Neck:      Thyroid: No thyromegaly. Trachea: No tracheal deviation. Cardiovascular:      Rate and Rhythm: Normal rate. Pulmonary:      Effort: Pulmonary effort is normal. No respiratory distress. Musculoskeletal:         General: Normal range of motion. Cervical back: Normal range of motion. Lymphadenopathy:      Cervical: No cervical adenopathy. Skin:     General: Skin is warm. Findings: No erythema. Neurological:      Mental Status: He is alert. Cranial Nerves: No cranial nerve deficit. Procedure: Cerumen Impaction    Pre-op: Cerumen Impaction    Post Op: Same    Procedure: Cerumen Impaction removal    Surgeon: Sherman Roblero    Procedure: Under microscopic assistance large cerumen impaction was removed using gentle suction and curette. TM intact B/L, Pt tolerated procedure well    Complications: None    Blood Loss Minimial    IMPRESSION/PLAN:  Patient seen and examined, and debridement today for what appears to be a fungal component, he is debrided and placed on clotrimazole. Nystatin powder is placed today as well. Follow-up in 10 days, for repeat ear cleaning and reevaluation. Dr. Dilip Landers.  Otolaryngology Facial Plastic Surgery  :  [address] [place] [address] Otolaryngology/Facial Plastic Surgery Residency  Associate Clinical Professor:  Malathi Ny, [facility]

## 2022-12-12 ENCOUNTER — TELEPHONE (OUTPATIENT)
Dept: NON INVASIVE DIAGNOSTICS | Age: 82
End: 2022-12-12

## 2022-12-12 NOTE — TELEPHONE ENCOUNTER
Patient returned my call. He wanted to know if we were getting his remotes. I informed him we have. I asked about his chest pain. I told him since he had bypass surgery in the past he needs to check with his cardiologist. He said Dr. Reema Alexis is his cardiologist. I explained to him Dr. Reema Alexis deals with heart rhythm issues, not blood flow issues to the heart. The patient told me he follows up with the [facility]. He will call the [facility] about his chest pain. He asked me if we faxed the orders for his blood work back in October 2022. I said I do not know the answer to that. The patient then told me the [facility] never received the order so he had not gotten the lab work done. I told him I will fax the orders for the Mag, TSH,Mag, CMP, and CBC to the [facility] ([phone]). I received fax confirmation for the lab work. I asked the patient to call the office when he gets his lab work completed. He told me he will do this.     Lyn Guzman RN, BSN  [facility]

## 2022-12-12 NOTE — TELEPHONE ENCOUNTER
I attempted to return a phone call from late Friday afternoon. Patient wondered if we were getting remote ILR transmissions. In his message he was reporting trouble with chest pain. In my message I informed him we have indeed been getting remote transmissions from his ILR. I also stated the ILR will not determine any issues with blood flow which can cause chest pain. In light of his past CABG I gave instructions for the patient to contact his cardiologist regarding his episodes of chest pain. I left a message for him to return my call.     Kesha Pavon RN, BSN  [facility]

## 2022-12-19 ENCOUNTER — TELEPHONE (OUTPATIENT)
Dept: ENT CLINIC | Age: 82
End: 2022-12-19

## 2022-12-19 NOTE — TELEPHONE ENCOUNTER
Start back on the ears Previously given for twice a day for another 5 to 7 days. May hold on using the hearing aid for the next 24 to 48 hours.

## 2022-12-19 NOTE — TELEPHONE ENCOUNTER
Pt called into office stating he's having a lot of issues with his ear and he called in this morning but has not heard back yet. Advised patient that Dr Wilver Galarza has been in clinic in another office all morning but the message has been sent to him for review. Advised patient we will call him back once Dr Wilver Galarza had responded with recommendations. Patient expressed understanding and expressed that he would like a call back before EOD today.

## 2022-12-19 NOTE — TELEPHONE ENCOUNTER
Ear aches and has bleed some , wearing hearaides, swollen . Qtip this morning with dried blood . Had a a hole in the ear drum in 2018 patched it .[facility] is the pharamcy .

## 2023-01-18 ENCOUNTER — TELEPHONE (OUTPATIENT)
Dept: NON INVASIVE DIAGNOSTICS | Age: 83
End: 2023-01-18

## 2023-01-18 NOTE — TELEPHONE ENCOUNTER
I returned the patient's call. He wanted to know about the lab work we requested at the [place]. I informed Maynor Allen I faxed the lab work request on 12/12/22. I had the fax transmission record that said the fax went through. He asked why we didn't call him about this. I explained I spoke with him a month ago and told him Dr. yR Choudhary wanted him to have lab work. He asked why I didn't call to see why it wasn't done. I told him I cannot call patient's all the time. The patient has some responsibility also. I spoke with him after Nerissa Sutton spoke with him on 10/5/22 regarding the lab work. It wasn't done, I called him again in December. I told Maynor Allen the order for the lab work is in his Epic chart. He can always get the labs done at a [facility] facility since he also has a [employer] [facility]. Maynor Allen said he will contact the [place]. He then asked me about his transmissions on his ILR. I told him we receive transmissions every month on his monitor. The ILR battery can last up to 3 years. Maynor Allen wanted to know how often he needs to come into the office. I explained usually the patient's with ILRs are seen once a year. He then asked me about the episode of chest pain. I explained that a cardiologist (florentin) deals with chest pain issues. Our physicians in this office are electrophysiologists (electricians) we deal with electrical issues-heart rhythm problems. If he is having chest pain he needs to call his cardiologist. He voiced understanding.     Renata Hylton RN, BSN  [facility]

## 2023-08-07 PROCEDURE — 93298 REM INTERROG DEV EVAL SCRMS: CPT | Performed by: STUDENT IN AN ORGANIZED HEALTH CARE EDUCATION/TRAINING PROGRAM

## 2023-08-07 PROCEDURE — G2066 INTER DEVC REMOTE 30D: HCPCS | Performed by: STUDENT IN AN ORGANIZED HEALTH CARE EDUCATION/TRAINING PROGRAM

## 2023-09-07 PROCEDURE — 93298 REM INTERROG DEV EVAL SCRMS: CPT | Performed by: STUDENT IN AN ORGANIZED HEALTH CARE EDUCATION/TRAINING PROGRAM

## 2023-09-07 PROCEDURE — G2066 INTER DEVC REMOTE 30D: HCPCS | Performed by: STUDENT IN AN ORGANIZED HEALTH CARE EDUCATION/TRAINING PROGRAM

## 2023-10-08 PROCEDURE — G2066 INTER DEVC REMOTE 30D: HCPCS | Performed by: STUDENT IN AN ORGANIZED HEALTH CARE EDUCATION/TRAINING PROGRAM

## 2023-10-08 PROCEDURE — 93298 REM INTERROG DEV EVAL SCRMS: CPT | Performed by: STUDENT IN AN ORGANIZED HEALTH CARE EDUCATION/TRAINING PROGRAM

## 2023-11-13 PROCEDURE — G2066 INTER DEVC REMOTE 30D: HCPCS | Performed by: STUDENT IN AN ORGANIZED HEALTH CARE EDUCATION/TRAINING PROGRAM

## 2023-11-13 PROCEDURE — 93298 REM INTERROG DEV EVAL SCRMS: CPT | Performed by: STUDENT IN AN ORGANIZED HEALTH CARE EDUCATION/TRAINING PROGRAM

## 2023-12-14 PROCEDURE — G2066 INTER DEVC REMOTE 30D: HCPCS | Performed by: STUDENT IN AN ORGANIZED HEALTH CARE EDUCATION/TRAINING PROGRAM

## 2023-12-14 PROCEDURE — 93298 REM INTERROG DEV EVAL SCRMS: CPT | Performed by: STUDENT IN AN ORGANIZED HEALTH CARE EDUCATION/TRAINING PROGRAM

## 2024-01-25 ENCOUNTER — TELEPHONE (OUTPATIENT)
Dept: NON INVASIVE DIAGNOSTICS | Age: 84
End: 2024-01-25

## 2024-01-25 NOTE — TELEPHONE ENCOUNTER
----- Message from Melva Serrano RN sent at 1/24/2024  2:39 PM EST -----  Regarding: office visit  Patient has a [employer] ILR. He last saw TB. He is overdue for an office visit

## 2024-01-29 PROCEDURE — 93298 REM INTERROG DEV EVAL SCRMS: CPT | Performed by: STUDENT IN AN ORGANIZED HEALTH CARE EDUCATION/TRAINING PROGRAM

## 2024-02-12 ENCOUNTER — TELEPHONE (OUTPATIENT)
Dept: NON INVASIVE DIAGNOSTICS | Age: 84
End: 2024-02-12

## 2024-02-13 ENCOUNTER — TELEPHONE (OUTPATIENT)
Dept: NON INVASIVE DIAGNOSTICS | Age: 84
End: 2024-02-13

## 2024-02-13 NOTE — TELEPHONE ENCOUNTER
Contacted patient to schedule follow-up with our office for  his device.  [facility]

## 2024-02-13 NOTE — TELEPHONE ENCOUNTER
I called the patient regarding his office visit here. Here states he will have the [facility] from this point on. I asked him about his [employer] ILR. I told him we continue to get monthly reports on his remote monitor. He asked why he doesn't get reports. I told him if he had MyChart he would have access to the reports. [facility]. [facility] will [facility] if they will take over management of his loop recorder. He will call us after he sees the [facility].     Melva Serrano RN, BSN  [facility] [place]   [facility]